# Patient Record
Sex: MALE | Race: WHITE | NOT HISPANIC OR LATINO | Employment: OTHER | URBAN - METROPOLITAN AREA
[De-identification: names, ages, dates, MRNs, and addresses within clinical notes are randomized per-mention and may not be internally consistent; named-entity substitution may affect disease eponyms.]

---

## 2023-04-14 ENCOUNTER — TELEPHONE (OUTPATIENT)
Dept: PRIMARY CARE | Facility: CLINIC | Age: 73
End: 2023-04-14
Payer: MEDICARE

## 2023-04-14 DIAGNOSIS — N52.9 MALE ERECTILE DISORDER OF ORGANIC ORIGIN: ICD-10-CM

## 2023-04-14 DIAGNOSIS — E29.1 HYPOGONADISM IN MALE: ICD-10-CM

## 2023-04-17 PROBLEM — G47.33 OSA ON CPAP: Status: ACTIVE | Noted: 2023-04-17

## 2023-04-17 PROBLEM — N52.9 MALE ERECTILE DISORDER OF ORGANIC ORIGIN: Status: ACTIVE | Noted: 2023-04-17

## 2023-04-17 PROBLEM — R39.11 BENIGN PROSTATIC HYPERPLASIA WITH URINARY HESITANCY: Status: ACTIVE | Noted: 2023-04-17

## 2023-04-17 PROBLEM — E29.1 HYPOGONADISM IN MALE: Status: ACTIVE | Noted: 2023-04-17

## 2023-04-17 PROBLEM — E78.5 HYPERLIPIDEMIA: Status: ACTIVE | Noted: 2023-04-17

## 2023-04-17 PROBLEM — K21.9 GERD (GASTROESOPHAGEAL REFLUX DISEASE): Status: ACTIVE | Noted: 2023-04-17

## 2023-04-17 PROBLEM — I10 BENIGN ESSENTIAL HYPERTENSION: Status: ACTIVE | Noted: 2023-04-17

## 2023-04-17 PROBLEM — I48.0 PAROXYSMAL ATRIAL FIBRILLATION (MULTI): Status: ACTIVE | Noted: 2023-04-17

## 2023-04-17 PROBLEM — N40.1 BENIGN PROSTATIC HYPERPLASIA WITH URINARY HESITANCY: Status: ACTIVE | Noted: 2023-04-17

## 2023-04-17 RX ORDER — PRAVASTATIN SODIUM 40 MG/1
40 TABLET ORAL DAILY
COMMUNITY
Start: 2023-02-26 | End: 2024-02-23 | Stop reason: SDUPTHER

## 2023-04-17 RX ORDER — SILDENAFIL 100 MG/1
100 TABLET, FILM COATED ORAL AS NEEDED
Qty: 10 TABLET | Refills: 3 | Status: SHIPPED | OUTPATIENT
Start: 2023-04-17

## 2023-04-17 RX ORDER — SILDENAFIL 100 MG/1
100 TABLET, FILM COATED ORAL AS NEEDED
COMMUNITY
End: 2023-04-17 | Stop reason: SDUPTHER

## 2023-04-17 RX ORDER — CYCLOBENZAPRINE HCL 10 MG
TABLET ORAL
COMMUNITY
Start: 2022-12-05 | End: 2023-06-22 | Stop reason: ALTCHOICE

## 2023-04-17 RX ORDER — LOSARTAN POTASSIUM AND HYDROCHLOROTHIAZIDE 25; 100 MG/1; MG/1
1 TABLET ORAL DAILY
COMMUNITY
Start: 2016-10-06 | End: 2023-06-22 | Stop reason: SDUPTHER

## 2023-04-17 RX ORDER — APIXABAN 5 MG/1
1 TABLET, FILM COATED ORAL 2 TIMES DAILY
COMMUNITY
End: 2023-05-30 | Stop reason: SDUPTHER

## 2023-04-17 RX ORDER — OMEPRAZOLE 20 MG/1
1 CAPSULE, DELAYED RELEASE ORAL DAILY
COMMUNITY
Start: 2016-09-07 | End: 2023-06-22 | Stop reason: SDUPTHER

## 2023-04-17 RX ORDER — METOPROLOL SUCCINATE 50 MG/1
1 TABLET, EXTENDED RELEASE ORAL DAILY
COMMUNITY
Start: 2017-08-09 | End: 2023-06-22 | Stop reason: SDUPTHER

## 2023-04-17 RX ORDER — AMLODIPINE BESYLATE 5 MG/1
1 TABLET ORAL DAILY
COMMUNITY
Start: 2020-06-08 | End: 2023-06-22 | Stop reason: SDUPTHER

## 2023-05-30 DIAGNOSIS — I48.0 PAROXYSMAL ATRIAL FIBRILLATION (MULTI): Primary | ICD-10-CM

## 2023-06-12 LAB
ALANINE AMINOTRANSFERASE (SGPT) (U/L) IN SER/PLAS: 20 U/L (ref 10–52)
ALBUMIN (G/DL) IN SER/PLAS: 4.2 G/DL (ref 3.4–5)
ALKALINE PHOSPHATASE (U/L) IN SER/PLAS: 49 U/L (ref 33–136)
ANION GAP IN SER/PLAS: 10 MMOL/L (ref 10–20)
ASPARTATE AMINOTRANSFERASE (SGOT) (U/L) IN SER/PLAS: 15 U/L (ref 9–39)
BILIRUBIN TOTAL (MG/DL) IN SER/PLAS: 0.9 MG/DL (ref 0–1.2)
CALCIUM (MG/DL) IN SER/PLAS: 9.6 MG/DL (ref 8.6–10.3)
CARBON DIOXIDE, TOTAL (MMOL/L) IN SER/PLAS: 30 MMOL/L (ref 21–32)
CHLORIDE (MMOL/L) IN SER/PLAS: 104 MMOL/L (ref 98–107)
CHOLESTEROL (MG/DL) IN SER/PLAS: 146 MG/DL (ref 0–199)
CHOLESTEROL IN HDL (MG/DL) IN SER/PLAS: 34.6 MG/DL
CHOLESTEROL/HDL RATIO: 4.2
CREATININE (MG/DL) IN SER/PLAS: 1.04 MG/DL (ref 0.5–1.3)
GFR MALE: 76 ML/MIN/1.73M2
GLUCOSE (MG/DL) IN SER/PLAS: 88 MG/DL (ref 74–99)
LDL: 89 MG/DL (ref 0–99)
POTASSIUM (MMOL/L) IN SER/PLAS: 4.2 MMOL/L (ref 3.5–5.3)
PROTEIN TOTAL: 7.1 G/DL (ref 6.4–8.2)
SODIUM (MMOL/L) IN SER/PLAS: 140 MMOL/L (ref 136–145)
TRIGLYCERIDE (MG/DL) IN SER/PLAS: 113 MG/DL (ref 0–149)
UREA NITROGEN (MG/DL) IN SER/PLAS: 19 MG/DL (ref 6–23)
VLDL: 23 MG/DL (ref 0–40)

## 2023-06-22 ENCOUNTER — OFFICE VISIT (OUTPATIENT)
Dept: PRIMARY CARE | Facility: CLINIC | Age: 73
End: 2023-06-22
Payer: MEDICARE

## 2023-06-22 VITALS
WEIGHT: 198 LBS | HEART RATE: 72 BPM | HEIGHT: 67 IN | SYSTOLIC BLOOD PRESSURE: 138 MMHG | BODY MASS INDEX: 31.08 KG/M2 | DIASTOLIC BLOOD PRESSURE: 82 MMHG

## 2023-06-22 DIAGNOSIS — N52.9 MALE ERECTILE DISORDER OF ORGANIC ORIGIN: ICD-10-CM

## 2023-06-22 DIAGNOSIS — G47.33 OSA ON CPAP: ICD-10-CM

## 2023-06-22 DIAGNOSIS — I10 BENIGN ESSENTIAL HYPERTENSION: ICD-10-CM

## 2023-06-22 DIAGNOSIS — N40.1 BENIGN PROSTATIC HYPERPLASIA WITH URINARY HESITANCY: ICD-10-CM

## 2023-06-22 DIAGNOSIS — E78.2 MIXED HYPERLIPIDEMIA: ICD-10-CM

## 2023-06-22 DIAGNOSIS — K21.9 GASTROESOPHAGEAL REFLUX DISEASE WITHOUT ESOPHAGITIS: ICD-10-CM

## 2023-06-22 DIAGNOSIS — E66.09 CLASS 1 OBESITY DUE TO EXCESS CALORIES WITH SERIOUS COMORBIDITY AND BODY MASS INDEX (BMI) OF 31.0 TO 31.9 IN ADULT: ICD-10-CM

## 2023-06-22 DIAGNOSIS — Z12.11 COLON CANCER SCREENING: ICD-10-CM

## 2023-06-22 DIAGNOSIS — R39.11 BENIGN PROSTATIC HYPERPLASIA WITH URINARY HESITANCY: ICD-10-CM

## 2023-06-22 DIAGNOSIS — I48.0 PAROXYSMAL ATRIAL FIBRILLATION (MULTI): ICD-10-CM

## 2023-06-22 DIAGNOSIS — Z00.00 MEDICARE ANNUAL WELLNESS VISIT, SUBSEQUENT: Primary | ICD-10-CM

## 2023-06-22 PROBLEM — E29.1 HYPOGONADISM IN MALE: Status: RESOLVED | Noted: 2023-04-17 | Resolved: 2023-06-22

## 2023-06-22 PROBLEM — E66.811 CLASS 1 OBESITY DUE TO EXCESS CALORIES WITH SERIOUS COMORBIDITY AND BODY MASS INDEX (BMI) OF 31.0 TO 31.9 IN ADULT: Status: ACTIVE | Noted: 2023-06-22

## 2023-06-22 PROCEDURE — 1159F MED LIST DOCD IN RCRD: CPT | Performed by: INTERNAL MEDICINE

## 2023-06-22 PROCEDURE — G0439 PPPS, SUBSEQ VISIT: HCPCS | Performed by: INTERNAL MEDICINE

## 2023-06-22 PROCEDURE — G0447 BEHAVIOR COUNSEL OBESITY 15M: HCPCS | Performed by: INTERNAL MEDICINE

## 2023-06-22 PROCEDURE — 1160F RVW MEDS BY RX/DR IN RCRD: CPT | Performed by: INTERNAL MEDICINE

## 2023-06-22 PROCEDURE — 1036F TOBACCO NON-USER: CPT | Performed by: INTERNAL MEDICINE

## 2023-06-22 PROCEDURE — 99397 PER PM REEVAL EST PAT 65+ YR: CPT | Performed by: INTERNAL MEDICINE

## 2023-06-22 PROCEDURE — 99214 OFFICE O/P EST MOD 30 MIN: CPT | Performed by: INTERNAL MEDICINE

## 2023-06-22 PROCEDURE — 3075F SYST BP GE 130 - 139MM HG: CPT | Performed by: INTERNAL MEDICINE

## 2023-06-22 PROCEDURE — 1170F FXNL STATUS ASSESSED: CPT | Performed by: INTERNAL MEDICINE

## 2023-06-22 PROCEDURE — G0446 INTENS BEHAVE THER CARDIO DX: HCPCS | Performed by: INTERNAL MEDICINE

## 2023-06-22 PROCEDURE — 99497 ADVNCD CARE PLAN 30 MIN: CPT | Performed by: INTERNAL MEDICINE

## 2023-06-22 PROCEDURE — 3079F DIAST BP 80-89 MM HG: CPT | Performed by: INTERNAL MEDICINE

## 2023-06-22 PROCEDURE — G0442 ANNUAL ALCOHOL SCREEN 15 MIN: HCPCS | Performed by: INTERNAL MEDICINE

## 2023-06-22 PROCEDURE — G0444 DEPRESSION SCREEN ANNUAL: HCPCS | Performed by: INTERNAL MEDICINE

## 2023-06-22 PROCEDURE — 3008F BODY MASS INDEX DOCD: CPT | Performed by: INTERNAL MEDICINE

## 2023-06-22 RX ORDER — METOPROLOL SUCCINATE 50 MG/1
50 TABLET, EXTENDED RELEASE ORAL DAILY
Qty: 90 TABLET | Refills: 3 | Status: SHIPPED | OUTPATIENT
Start: 2023-06-22

## 2023-06-22 RX ORDER — AMLODIPINE BESYLATE 5 MG/1
5 TABLET ORAL DAILY
Qty: 90 TABLET | Refills: 3 | Status: SHIPPED | OUTPATIENT
Start: 2023-06-22

## 2023-06-22 RX ORDER — LOSARTAN POTASSIUM AND HYDROCHLOROTHIAZIDE 25; 100 MG/1; MG/1
1 TABLET ORAL DAILY
Qty: 90 TABLET | Refills: 3 | Status: SHIPPED | OUTPATIENT
Start: 2023-06-22

## 2023-06-22 RX ORDER — OMEPRAZOLE 20 MG/1
20 CAPSULE, DELAYED RELEASE ORAL DAILY
Qty: 90 CAPSULE | Refills: 3 | Status: SHIPPED | OUTPATIENT
Start: 2023-06-22

## 2023-06-22 ASSESSMENT — ENCOUNTER SYMPTOMS
OCCASIONAL FEELINGS OF UNSTEADINESS: 0
DEPRESSION: 0
LOSS OF SENSATION IN FEET: 0

## 2023-06-22 ASSESSMENT — ACTIVITIES OF DAILY LIVING (ADL)
DRESSING: INDEPENDENT
BATHING: INDEPENDENT
DOING_HOUSEWORK: INDEPENDENT
TAKING_MEDICATION: INDEPENDENT
MANAGING_FINANCES: INDEPENDENT
GROCERY_SHOPPING: INDEPENDENT

## 2023-06-22 ASSESSMENT — PATIENT HEALTH QUESTIONNAIRE - PHQ9
2. FEELING DOWN, DEPRESSED OR HOPELESS: NOT AT ALL
1. LITTLE INTEREST OR PLEASURE IN DOING THINGS: NOT AT ALL
SUM OF ALL RESPONSES TO PHQ9 QUESTIONS 1 AND 2: 0

## 2023-06-22 ASSESSMENT — ANXIETY QUESTIONNAIRES
2. NOT BEING ABLE TO STOP OR CONTROL WORRYING: NOT AT ALL
6. BECOMING EASILY ANNOYED OR IRRITABLE: NOT AT ALL
IF YOU CHECKED OFF ANY PROBLEMS ON THIS QUESTIONNAIRE, HOW DIFFICULT HAVE THESE PROBLEMS MADE IT FOR YOU TO DO YOUR WORK, TAKE CARE OF THINGS AT HOME, OR GET ALONG WITH OTHER PEOPLE: NOT DIFFICULT AT ALL
3. WORRYING TOO MUCH ABOUT DIFFERENT THINGS: NOT AT ALL
5. BEING SO RESTLESS THAT IT IS HARD TO SIT STILL: NOT AT ALL
4. TROUBLE RELAXING: NOT AT ALL
7. FEELING AFRAID AS IF SOMETHING AWFUL MIGHT HAPPEN: NOT AT ALL
GAD7 TOTAL SCORE: 0
1. FEELING NERVOUS, ANXIOUS, OR ON EDGE: NOT AT ALL

## 2023-06-22 NOTE — PROGRESS NOTES
"Subjective   Reason for Visit: Gerhard Templeton is an 73 y.o. male here for a Medicare Wellness visit.     Past Medical, Surgical, and Family History reviewed and updated in chart.    Reviewed all medications by prescribing practitioner or clinical pharmacist (such as prescriptions, OTCs, herbal therapies and supplements) and documented in the medical record.    HPI    Patient Care Team:  Kavon Shepherd DO as PCP - General  Kavon Shepherd DO as PCP - [employer]age PCP     Review of Systems    Objective   Vitals:  /82   Pulse 72   Ht 1.702 m (5' 7\")   Wt 89.8 kg (198 lb)   BMI 31.01 kg/m²       Physical Exam    Assessment/Plan   Problem List Items Addressed This Visit    None         "

## 2023-06-22 NOTE — ASSESSMENT & PLAN NOTE
Encourage Shingrix vaccination up-to-date date with vaccinations and screenings repeat Cologuard testing for colon cancer screenings screen for alcohol depression completed today advanced medical directives completed follow-up in 6 months

## 2023-06-22 NOTE — ASSESSMENT & PLAN NOTE
Blood pressures ambulatory at home 120/70 encouraged weight loss reduction in sodium intake-diet  Continue amlodipine 5 mg daily with losartan hydrochlorothiazide 100/25 1 tablet daily

## 2023-06-22 NOTE — ASSESSMENT & PLAN NOTE
Compliant with regular nighttime use of CPAP on a 5-hour basis no signs of daytime hypersomnolence well-controlled at this time

## 2023-06-22 NOTE — ASSESSMENT & PLAN NOTE
Rate and rhythm controlled on metoprolol succinate ER 50 mg 1 tablet daily with apixaban 5 mg twice a day no evidence of bleeding

## 2023-06-22 NOTE — ASSESSMENT & PLAN NOTE
12 pound weight gain since last examination 6 months ago patient to reinitiate improvement in diet and exercise regimen to reduce weight less than 30 BMI goal weight 195 pounds

## 2023-06-22 NOTE — PROGRESS NOTES
Alcohol consumption becomes hazardous when consuming women [place] over 65 years old greater than 7 drinks per week or greater than 3 drinks per occasion for men greater than 14 drinks per week or greater than 4 drinks per occasion.  I spent 15 minutes screening for alcohol use.Depression and anxiety screening completed   PHQ9 score   GAD7 score   I spent 15 minutes obtaining and discussing depression screening using PHQ 2 questions with results documented in chart.  Screening using PHQ-9 and KAYLAN-7 scores were used for follow-up with treatment and referral plan discussed.      I spent greater than 15 minutes face-to-face with individual providing recommendations for nutrition choices and exercise plan to help achieve weight reductionI spent 15 minutes face-to-face with this individual discussing the cardiovascular risk and behavioral therapies of nutritional choices exercise and elimination of habits contributing to risk .We agreed on a plan how they may be able to reduce  current cardiovascular risk.  Per patient with calculation greater than 10% aspirin use was discussed and encouraged unless known allergy or increased risk of bleeding contraindicates use patient's 10-year CV risk estimate calculates:I spent greater than 15 minutes discussing advance care planning including the explanation and discussion of advanced directives.  If patient does not have current up-to-date documents examples and information provided on how to create both living will and power of . [facility] toolkit was given to patient and was encouraged to work out completing these documents.Subjective   Reason for Visit: Gerhard Templeton is an 73 y.o. male here for a Medicare Wellness visit.     Past Medical, Surgical, and Family History reviewed and updated in chart.    Reviewed all medications by prescribing practitioner or clinical pharmacist (such as prescriptions, OTCs, herbal therapies and supplements) and documented in the medical  "record.    HPI    Patient Care Team:  Kavon Shepherd DO as PCP - General  Kavon Shepherd DO as PCP - [employer] Advantage PCP     Review of Systems   All other systems reviewed and are negative.      Objective   Vitals:  /82   Pulse 72   Ht 1.702 m (5' 7\")   Wt 89.8 kg (198 lb)   BMI 31.01 kg/m²       Physical Exam  Vitals and nursing note reviewed.   Constitutional:       General: He is not in acute distress.     Appearance: Normal appearance. He is well-developed. He is not toxic-appearing.   HENT:      Head: Normocephalic and atraumatic.      Right Ear: Tympanic membrane and external ear normal.      Left Ear: Tympanic membrane and external ear normal.      Nose: Nose normal.      Mouth/Throat:      Mouth: Mucous membranes are moist.      Pharynx: Oropharynx is clear. No oropharyngeal exudate or posterior oropharyngeal erythema.      Tonsils: No tonsillar exudate. 2+ on the right. 2+ on the left.   Eyes:      Extraocular Movements: Extraocular movements intact.      Conjunctiva/sclera: Conjunctivae normal.   Cardiovascular:      Rate and Rhythm: Normal rate and regular rhythm.      Pulses: Normal pulses.      Heart sounds: Normal heart sounds. No murmur heard.  Pulmonary:      Effort: Pulmonary effort is normal.      Breath sounds: Normal breath sounds.   Abdominal:      General: Abdomen is flat. Bowel sounds are normal.      Palpations: Abdomen is soft.   Musculoskeletal:      Cervical back: Neck supple.   Feet:      Right foot:      Skin integrity: Skin integrity normal. No ulcer, blister, skin breakdown, erythema, warmth or callus.      Toenail Condition: Right toenails are normal.      Left foot:      Skin integrity: Skin integrity normal. No ulcer, blister, skin breakdown, erythema, warmth or callus.      Toenail Condition: Left toenails are normal.   Lymphadenopathy:      Cervical: No cervical adenopathy.   Skin:     General: Skin is warm and dry.      Capillary Refill: Capillary " refill takes more than 3 seconds.      Findings: No rash.   Neurological:      Mental Status: He is alert. Mental status is at baseline.      Sensory: Sensation is intact.   Psychiatric:         Mood and Affect: Mood normal.         Behavior: Behavior normal.         Thought Content: Thought content normal.         Judgment: Judgment normal.         Assessment/Plan   Problem List Items Addressed This Visit       Benign essential hypertension    Current Assessment & Plan     Blood pressures ambulatory at home 120/70 encouraged weight loss reduction in sodium intake-diet  Continue amlodipine 5 mg daily with losartan hydrochlorothiazide 100/25 1 tablet daily         Relevant Medications    amLODIPine (Norvasc) 5 mg tablet    losartan-hydrochlorothiazide (Hyzaar) 100-25 mg tablet    metoprolol succinate XL (Toprol-XL) 50 mg 24 hr tablet    omeprazole (PriLOSEC) 20 mg DR capsule    Other Relevant Orders    Hepatitis C antibody    Comprehensive metabolic panel    Lipid Panel    Cologuard® colon cancer screening    Follow Up In [facility] - PCP    Benign prostatic hyperplasia with urinary hesitancy    Current Assessment & Plan     Symptoms stable and unchanged at this time         Relevant Orders    Hepatitis C antibody    Comprehensive metabolic panel    Lipid Panel    Cologuard® colon cancer screening    Follow Up In [facility] - PCP    GERD (gastroesophageal reflux disease)    Current Assessment & Plan     Symptoms controlled on omeprazole 20 mg daily         Relevant Orders    Hepatitis C antibody    Comprehensive metabolic panel    Lipid Panel    Cologuard® colon cancer screening    Follow Up In [facility] - PCP    Hyperlipidemia    Relevant Orders    Hepatitis C antibody    Comprehensive metabolic panel    Lipid Panel    Cologuard® colon cancer screening    Follow Up In [facility] - PCP    Male erectile disorder of organic origin    Current Assessment & Plan     Continue  with as needed use of sildenafil         Relevant Orders    Hepatitis C antibody    Comprehensive metabolic panel    Lipid Panel    Cologuard® colon cancer screening    Follow Up In [facility] - PCP    ROSA on CPAP    Current Assessment & Plan     Compliant with regular nighttime use of CPAP on a 5-hour basis no signs of daytime hypersomnolence well-controlled at this time         Relevant Orders    Hepatitis C antibody    Comprehensive metabolic panel    Lipid Panel    Cologuard® colon cancer screening    Follow Up In [facility] - PCP    Paroxysmal atrial fibrillation (CMS/HCC)    Current Assessment & Plan     Rate and rhythm controlled on metoprolol succinate ER 50 mg 1 tablet daily with apixaban 5 mg twice a day no evidence of bleeding         Relevant Medications    amLODIPine (Norvasc) 5 mg tablet    metoprolol succinate XL (Toprol-XL) 50 mg 24 hr tablet    Other Relevant Orders    Hepatitis C antibody    Comprehensive metabolic panel    Lipid Panel    Cologuard® colon cancer screening    Follow Up In [facility] - PCP    Medicare annual wellness visit, subsequent - Primary    Current Assessment & Plan     Encourage Shingrix vaccination up-to-date date with vaccinations and screenings repeat Cologuard testing for colon cancer screenings screen for alcohol depression completed today advanced medical directives completed follow-up in 6 months         Relevant Orders    Hepatitis C antibody    Comprehensive metabolic panel    Lipid Panel    Cologuard® colon cancer screening    Follow Up In [facility] - PCP    Class 1 obesity due to excess calories with serious comorbidity and body mass index (BMI) of 31.0 to 31.9 in adult    Current Assessment & Plan     12 pound weight gain since last examination 6 months ago patient to reinitiate improvement in diet and exercise regimen to reduce weight less than 30 BMI goal weight 195 pounds         Relevant Orders    Hepatitis C  antibody    Comprehensive metabolic panel    Lipid Panel    Cologuard® colon cancer screening    Follow Up In [facility] - PCP    Colon cancer screening    Relevant Orders    Cologuard® colon cancer screening

## 2023-07-14 LAB — NONINV COLON CA DNA+OCC BLD SCRN STL QL: NEGATIVE

## 2023-10-04 ENCOUNTER — TELEPHONE (OUTPATIENT)
Dept: PRIMARY CARE | Facility: CLINIC | Age: 73
End: 2023-10-04
Payer: MEDICARE

## 2023-10-04 DIAGNOSIS — U07.1 COVID-19: Primary | ICD-10-CM

## 2023-10-04 RX ORDER — NIRMATRELVIR AND RITONAVIR 300-100 MG
3 KIT ORAL 2 TIMES DAILY
Qty: 30 TABLET | Refills: 0 | Status: SHIPPED | OUTPATIENT
Start: 2023-10-04 | End: 2023-10-09

## 2023-10-04 NOTE — TELEPHONE ENCOUNTER
Wife informed rx called in hold statin for 2 weeks,reduce apixaban to 2.5mg 2 times a day when on paxlovid resume 5mg 2 times after finishing paxlovid.    Paxlovid called in.  Pravastatin hold for 2 weeks reduce apixaban to 2.5 mg twice a day while on Paxlovid okay to resume 5 mg twice a day after finishing medication

## 2023-10-04 NOTE — TELEPHONE ENCOUNTER
Chief Complaint: Covid positive today     Symptoms: headache, sinus pressure, sinus congestion, cough    Duration: two week ago and tested but it was negative      Treatments:    Patient Requesting: Recommendation     Did the patient have their Covid Vaccine? Yes but not the one from fall     Pharmacy: [facility]

## 2023-10-26 ENCOUNTER — OFFICE VISIT (OUTPATIENT)
Dept: CARDIOLOGY | Facility: CLINIC | Age: 73
End: 2023-10-26
Payer: MEDICARE

## 2023-10-26 VITALS
BODY MASS INDEX: 30.32 KG/M2 | HEART RATE: 81 BPM | HEIGHT: 67 IN | SYSTOLIC BLOOD PRESSURE: 118 MMHG | DIASTOLIC BLOOD PRESSURE: 78 MMHG | WEIGHT: 193.2 LBS

## 2023-10-26 DIAGNOSIS — I10 BENIGN ESSENTIAL HYPERTENSION: ICD-10-CM

## 2023-10-26 DIAGNOSIS — E78.5 HYPERLIPIDEMIA, UNSPECIFIED HYPERLIPIDEMIA TYPE: ICD-10-CM

## 2023-10-26 DIAGNOSIS — I48.0 PAROXYSMAL ATRIAL FIBRILLATION (MULTI): Primary | ICD-10-CM

## 2023-10-26 PROCEDURE — 1159F MED LIST DOCD IN RCRD: CPT | Performed by: INTERNAL MEDICINE

## 2023-10-26 PROCEDURE — 1160F RVW MEDS BY RX/DR IN RCRD: CPT | Performed by: INTERNAL MEDICINE

## 2023-10-26 PROCEDURE — 99214 OFFICE O/P EST MOD 30 MIN: CPT | Performed by: INTERNAL MEDICINE

## 2023-10-26 PROCEDURE — 3008F BODY MASS INDEX DOCD: CPT | Performed by: INTERNAL MEDICINE

## 2023-10-26 PROCEDURE — 1036F TOBACCO NON-USER: CPT | Performed by: INTERNAL MEDICINE

## 2023-10-26 PROCEDURE — 3078F DIAST BP <80 MM HG: CPT | Performed by: INTERNAL MEDICINE

## 2023-10-26 PROCEDURE — 3074F SYST BP LT 130 MM HG: CPT | Performed by: INTERNAL MEDICINE

## 2023-10-26 PROCEDURE — 93000 ELECTROCARDIOGRAM COMPLETE: CPT | Performed by: INTERNAL MEDICINE

## 2023-10-26 RX ORDER — DOXYCYCLINE HYCLATE 100 MG
100 TABLET ORAL 2 TIMES DAILY
COMMUNITY
Start: 2023-10-14 | End: 2023-10-26 | Stop reason: WASHOUT

## 2023-10-26 NOTE — ASSESSMENT & PLAN NOTE
Patient has hypertension which is well-controlled on the current regimen. Blood pressure goal is less than 140/90 and preferably towards 120/70.  Blood pressure is at goal.. Patient will continue the regimen. Advised to keep home blood pressure log and bring to PCP

## 2023-10-26 NOTE — ASSESSMENT & PLAN NOTE
The patient has paroxysmal atrial fibrillation. He is maintaining sinus rhythm the majority of the time. He does not feel the atrial fibrillation at all. He is [id number] 2 , remains on OAC.  Plan rate control only at this point.

## 2023-10-26 NOTE — ASSESSMENT & PLAN NOTE
The patient has hyperlipidemia which is well-controlled on the current regimen. He will continue his medication.

## 2023-10-26 NOTE — PROGRESS NOTES
"Chief Complaint:   No chief complaint on file.     History Of Present Illness:    Gerhard Templeton is a 73 y.o. male  (pronounced \" Like- liter\")  who presents for cardiac follow-up of paroxysmal atrial fibrillation and hypertension. The patient was diagnosed with paroxysmal atrial fibrillation when he presented for his second knee operation. He spontaneously converted to sinus rhythm and did not need cardioversion. With a [id number] of 2 he was placed on anticoagulation therapy. He is also on beta blockers for ventricular rate control and antihypertensive effect. The patient tells me that he is feeling well without chest pain, dyspnea, orthopnea, PND, palpitation, lightheadedness, syncope, or edema. No claudication. He has been taking his medications as prescribed and without adverse effect.     EKG is a fibrillation with controlled ventricular response with low voltage in the precordial leads. Incomplete right bundle branch block. Left atrial enlargement. LVH. Unchanged.      Echocardiogram in March 2017 with normal left and right ventricular systolic function. Mild mitral regurgitation. Mild tricuspid regurgitation.     Regadenoson myocardial perfusion scan in March 2017 showed normal perfusion.     In February 2020, cholesterol 130, triglycerides 78, HDL 35, LDL 80, creatinine 0.99, potassium 3.8, TSH 3.2. In Jan 2022- . .     Last Recorded Vitals:  Vitals:    10/26/23 1503   BP: 118/78   Pulse: 81   Weight: 87.6 kg (193 lb 3.2 oz)   Height: 1.702 m (5' 7\")       Past Medical History:  He has a past medical history of Encounter for preprocedural cardiovascular examination (03/15/2017), Obstructive sleep apnea (adult) (pediatric), Personal history of other diseases of the circulatory system (03/07/2019), Personal history of other diseases of the digestive system, and Personal history of other diseases of the musculoskeletal system and connective tissue.    Past Surgical History:  He has a past " surgical history that includes Total knee arthroplasty (03/10/2017); Other surgical history (03/10/2017); Other surgical history (03/10/2017); and Rotator cuff repair (03/15/2017).      Social History:  He reports that he has never smoked. He has never used smokeless tobacco. He reports that he does not currently use alcohol. He reports that he does not use drugs.    Family History:  Family History   Problem Relation Name Age of Onset    Atrial fibrillation Mother      Other (CVA) Mother      Coronary artery disease Father      Heart attack Father          Allergies:  Patient has no known allergies.    Outpatient Medications:  Current Outpatient Medications   Medication Instructions    amLODIPine (NORVASC) 5 mg, oral, Daily    apixaban (ELIQUIS) 5 mg, oral, 2 times daily    doxycycline (VIBRA-TABS) 100 mg, oral, 2 times daily    losartan-hydrochlorothiazide (Hyzaar) 100-25 mg tablet 1 tablet, oral, Daily    metoprolol succinate XL (TOPROL-XL) 50 mg, oral, Daily    omeprazole (PRILOSEC) 20 mg, oral, Daily    pravastatin (PRAVACHOL) 40 mg, oral, Daily    sildenafil (VIAGRA) 100 mg, oral, As needed       Physical Exam:  Physical Exam  Vitals reviewed.   Constitutional:       Appearance: Normal appearance.   Neck:      Vascular: No carotid bruit or JVD.   Cardiovascular:      Rate and Rhythm: Normal rate. Rhythm regularly irregular.      Heart sounds: Normal heart sounds, S1 normal and S2 normal. No murmur heard.  Pulmonary:      Effort: Pulmonary effort is normal.      Breath sounds: Normal breath sounds.   Abdominal:      General: Abdomen is flat. Bowel sounds are normal.      Palpations: Abdomen is soft.   Musculoskeletal:      Right lower leg: No edema.      Left lower leg: No edema.   Skin:     General: Skin is warm.   Neurological:      Mental Status: He is alert. Mental status is at baseline.   Psychiatric:         Mood and Affect: Mood normal.         Behavior: Behavior normal.           Last Labs:  CBC -  Lab  "Results   Component Value Date    WBC 5.9 04/29/2022    HGB 14.6 04/29/2022    HCT 43.1 04/29/2022    MCV 86 04/29/2022     04/29/2022       CMP -  Lab Results   Component Value Date    CALCIUM 9.6 06/12/2023    PROT 7.1 06/12/2023    ALBUMIN 4.2 06/12/2023    AST 15 06/12/2023    ALT 20 06/12/2023    ALKPHOS 49 06/12/2023    BILITOT 0.9 06/12/2023       LIPID PANEL -   Lab Results   Component Value Date    CHOL 146 06/12/2023    TRIG 113 06/12/2023    HDL 34.6 (A) 06/12/2023    CHHDL 4.2 06/12/2023    LDLF 89 06/12/2023    VLDL 23 06/12/2023       RENAL FUNCTION PANEL -   Lab Results   Component Value Date    GLUCOSE 88 06/12/2023     06/12/2023    K 4.2 06/12/2023     06/12/2023    CO2 30 06/12/2023    ANIONGAP 10 06/12/2023    BUN 19 06/12/2023    CREATININE 1.04 06/12/2023    GFRMALE 76 06/12/2023    CALCIUM 9.6 06/12/2023    ALBUMIN 4.2 06/12/2023        No results found for: \"BNP\", \"HGBA1C\"      Assessment/Plan   Problem List Items Addressed This Visit             ICD-10-CM    Benign essential hypertension I10     Patient has hypertension which is well-controlled on the current regimen. Blood pressure goal is less than 140/90 and preferably towards 120/70.  Blood pressure is at goal.. Patient will continue the regimen. Advised to keep home blood pressure log and bring to PCP          Relevant Orders    ECG 12 lead (Clinic Performed) (Completed)    Follow Up In Cardiology    Hyperlipidemia E78.5     The patient has hyperlipidemia which is well-controlled on the current regimen. He will continue his medication.          Paroxysmal atrial fibrillation (CMS/HCC) - Primary I48.0     The patient has paroxysmal atrial fibrillation. He is maintaining sinus rhythm the majority of the time. He does not feel the atrial fibrillation at all. He is [id number] 2 , remains on OAC.  Plan rate control only at this point.            Relevant Orders    ECG 12 lead (Clinic Performed) (Completed)    Follow Up In " Cardiology         Katie Narayanan MD

## 2023-10-28 DIAGNOSIS — I48.0 PAROXYSMAL ATRIAL FIBRILLATION (MULTI): ICD-10-CM

## 2023-10-30 ENCOUNTER — TELEPHONE (OUTPATIENT)
Dept: PRIMARY CARE | Facility: CLINIC | Age: 73
End: 2023-10-30
Payer: MEDICARE

## 2023-10-30 DIAGNOSIS — I48.0 PAROXYSMAL ATRIAL FIBRILLATION (MULTI): ICD-10-CM

## 2023-10-30 RX ORDER — APIXABAN 5 MG/1
5 TABLET, FILM COATED ORAL 2 TIMES DAILY
Qty: 180 TABLET | Refills: 3 | Status: SHIPPED | OUTPATIENT
Start: 2023-10-30

## 2023-10-30 NOTE — TELEPHONE ENCOUNTER
Needs a refill on his Eliquis 5 mg takes it 2 time a day [facility] to [facility]. And can we send a short term to [facility] in [place]

## 2023-12-22 ENCOUNTER — OFFICE VISIT (OUTPATIENT)
Dept: PRIMARY CARE | Facility: CLINIC | Age: 73
End: 2023-12-22
Payer: MEDICARE

## 2023-12-22 VITALS
WEIGHT: 198.8 LBS | BODY MASS INDEX: 31.2 KG/M2 | SYSTOLIC BLOOD PRESSURE: 122 MMHG | OXYGEN SATURATION: 98 % | DIASTOLIC BLOOD PRESSURE: 80 MMHG | HEIGHT: 67 IN | HEART RATE: 65 BPM

## 2023-12-22 DIAGNOSIS — K21.9 GASTROESOPHAGEAL REFLUX DISEASE WITHOUT ESOPHAGITIS: ICD-10-CM

## 2023-12-22 DIAGNOSIS — I48.0 PAROXYSMAL ATRIAL FIBRILLATION (MULTI): ICD-10-CM

## 2023-12-22 DIAGNOSIS — N40.1 BENIGN PROSTATIC HYPERPLASIA WITH URINARY HESITANCY: ICD-10-CM

## 2023-12-22 DIAGNOSIS — E66.09 CLASS 1 OBESITY DUE TO EXCESS CALORIES WITH SERIOUS COMORBIDITY AND BODY MASS INDEX (BMI) OF 31.0 TO 31.9 IN ADULT: ICD-10-CM

## 2023-12-22 DIAGNOSIS — E78.2 MIXED HYPERLIPIDEMIA: ICD-10-CM

## 2023-12-22 DIAGNOSIS — Z12.11 COLON CANCER SCREENING: ICD-10-CM

## 2023-12-22 DIAGNOSIS — I10 BENIGN ESSENTIAL HYPERTENSION: Primary | ICD-10-CM

## 2023-12-22 DIAGNOSIS — N52.9 MALE ERECTILE DISORDER OF ORGANIC ORIGIN: ICD-10-CM

## 2023-12-22 DIAGNOSIS — G47.33 OSA ON CPAP: ICD-10-CM

## 2023-12-22 DIAGNOSIS — R39.11 BENIGN PROSTATIC HYPERPLASIA WITH URINARY HESITANCY: ICD-10-CM

## 2023-12-22 PROCEDURE — 3079F DIAST BP 80-89 MM HG: CPT | Performed by: INTERNAL MEDICINE

## 2023-12-22 PROCEDURE — 99213 OFFICE O/P EST LOW 20 MIN: CPT | Performed by: INTERNAL MEDICINE

## 2023-12-22 PROCEDURE — 1159F MED LIST DOCD IN RCRD: CPT | Performed by: INTERNAL MEDICINE

## 2023-12-22 PROCEDURE — 3074F SYST BP LT 130 MM HG: CPT | Performed by: INTERNAL MEDICINE

## 2023-12-22 PROCEDURE — 1160F RVW MEDS BY RX/DR IN RCRD: CPT | Performed by: INTERNAL MEDICINE

## 2023-12-22 PROCEDURE — 1036F TOBACCO NON-USER: CPT | Performed by: INTERNAL MEDICINE

## 2023-12-22 PROCEDURE — 3008F BODY MASS INDEX DOCD: CPT | Performed by: INTERNAL MEDICINE

## 2023-12-22 NOTE — ASSESSMENT & PLAN NOTE
Blood pressure is well-controlled on losartan hydrochlorothiazide 100/25 1 tablet daily amlodipine 5 mg daily and metoprolol succinate ER 50 mg 1 tablet daily reevaluate blood work next visit

## 2023-12-22 NOTE — ASSESSMENT & PLAN NOTE
Compliant with regular nighttime use of CPAP with effective control of daytime hypersomnolence no issues at this time continue

## 2023-12-22 NOTE — PROGRESS NOTES
"Subjective   Reason for Visit: Gerhard Templeton is an 73 y.o. male here for a fu visit.     Past Medical, Surgical, and Family History reviewed and updated in chart.    Reviewed all medications by prescribing practitioner or clinical pharmacist (such as prescriptions, OTCs, herbal therapies and supplements) and documented in the medical record.    HPI    Patient Care Team:  Kavon Shepherd DO as PCP - General  Kavon Shepherd DO as PCP - [employer]age PCP     Review of Systems   All other systems reviewed and are negative.      Objective   Vitals:  /80 (BP Location: Right arm, Patient Position: Sitting)   Pulse 65   Ht 1.702 m (5' 7\")   Wt 90.2 kg (198 lb 12.8 oz)   SpO2 98%   BMI 31.14 kg/m²       Physical Exam  Vitals and nursing note reviewed.   Constitutional:       General: He is not in acute distress.     Appearance: Normal appearance. He is well-developed. He is obese. He is not toxic-appearing.   HENT:      Head: Normocephalic and atraumatic.      Right Ear: Tympanic membrane and external ear normal.      Left Ear: Tympanic membrane and external ear normal.      Nose: Nose normal.      Mouth/Throat:      Mouth: Mucous membranes are moist.      Pharynx: Oropharynx is clear. No oropharyngeal exudate or posterior oropharyngeal erythema.      Tonsils: No tonsillar exudate. 2+ on the right. 2+ on the left.   Eyes:      Extraocular Movements: Extraocular movements intact.      Conjunctiva/sclera: Conjunctivae normal.   Cardiovascular:      Rate and Rhythm: Normal rate and regular rhythm.      Pulses: Normal pulses.      Heart sounds: Normal heart sounds. No murmur heard.  Pulmonary:      Effort: Pulmonary effort is normal.      Breath sounds: Normal breath sounds.   Abdominal:      General: Abdomen is flat. Bowel sounds are normal.      Palpations: Abdomen is soft.   Musculoskeletal:      Cervical back: Neck supple.   Feet:      Right foot:      Skin integrity: Skin integrity " normal. No ulcer, blister, skin breakdown, erythema, warmth or callus.      Toenail Condition: Right toenails are normal.      Left foot:      Skin integrity: Skin integrity normal. No ulcer, blister, skin breakdown, erythema, warmth or callus.      Toenail Condition: Left toenails are normal.   Lymphadenopathy:      Cervical: No cervical adenopathy.   Skin:     General: Skin is warm and dry.      Capillary Refill: Capillary refill takes more than 3 seconds.      Findings: No rash.   Neurological:      Mental Status: He is alert. Mental status is at baseline.      Sensory: Sensation is intact.   Psychiatric:         Mood and Affect: Mood normal.         Behavior: Behavior normal.         Thought Content: Thought content normal.         Judgment: Judgment normal.         Assessment/Plan   Problem List Items Addressed This Visit       Benign essential hypertension - Primary    Current Assessment & Plan     Blood pressure is well-controlled on losartan hydrochlorothiazide 100/25 1 tablet daily amlodipine 5 mg daily and metoprolol succinate ER 50 mg 1 tablet daily reevaluate blood work next visit         Relevant Orders    Follow Up In [facility] - PCP - Established    Comprehensive metabolic panel    CBC and Auto Differential    Lipid Panel    Benign prostatic hyperplasia with urinary hesitancy    GERD (gastroesophageal reflux disease)    Hyperlipidemia    Current Assessment & Plan     Reevaluate lipid profile stable and optimal on pravastatin 40 mg daily continue         Relevant Orders    Follow Up In [facility] - PCP - Established    Comprehensive metabolic panel    CBC and Auto Differential    Lipid Panel    Male erectile disorder of organic origin    ROSA on CPAP    Current Assessment & Plan     Compliant with regular nighttime use of CPAP with effective control of daytime hypersomnolence no issues at this time continue         Paroxysmal atrial fibrillation (CMS/HCC)    Current Assessment &  Plan     Normal sinus rhythm at this time today doing well continue metoprolol succinate ER 50 mg 1 tablet daily and apixaban 5 mg twice a day no bleeding issues at this time check blood work patient denies any issues with fatigue or exertional intolerance         Relevant Orders    Follow Up In [facility] - PCP - Established    Comprehensive metabolic panel    CBC and Auto Differential    Lipid Panel    Class 1 obesity due to excess calories with serious comorbidity and body mass index (BMI) of 31.0 to 31.9 in adult    Current Assessment & Plan     Work on improving BMI to less than 38 with diet and exercise changes reevaluate with next visit education given         Relevant Orders    Follow Up In [facility] - PCP - Established    Comprehensive metabolic panel    CBC and Auto Differential    Lipid Panel    Colon cancer screening    Current Assessment & Plan     Cologuard testing negative completed 7/10/2023 okay for 3 years surveillance

## 2023-12-22 NOTE — ASSESSMENT & PLAN NOTE
Work on improving BMI to less than 38 with diet and exercise changes reevaluate with next visit education given

## 2023-12-22 NOTE — ASSESSMENT & PLAN NOTE
Normal sinus rhythm at this time today doing well continue metoprolol succinate ER 50 mg 1 tablet daily and apixaban 5 mg twice a day no bleeding issues at this time check blood work patient denies any issues with fatigue or exertional intolerance

## 2023-12-22 NOTE — PROGRESS NOTES
"Subjective   Patient ID: Gerhard Templeton is a 73 y.o. male who presents for Follow-up (6 month).    HPI     Review of Systems    Objective   /80 (BP Location: Right arm, Patient Position: Sitting)   Pulse 65   Ht 1.702 m (5' 7\")   Wt 90.2 kg (198 lb 12.8 oz)   SpO2 98%   BMI 31.14 kg/m²     Physical Exam    Assessment/Plan          "

## 2024-02-23 ENCOUNTER — TELEPHONE (OUTPATIENT)
Dept: PRIMARY CARE | Facility: CLINIC | Age: 74
End: 2024-02-23
Payer: MEDICARE

## 2024-02-23 DIAGNOSIS — E78.2 MIXED HYPERLIPIDEMIA: ICD-10-CM

## 2024-02-23 RX ORDER — PRAVASTATIN SODIUM 40 MG/1
40 TABLET ORAL DAILY
Qty: 90 TABLET | Refills: 3 | Status: SHIPPED | OUTPATIENT
Start: 2024-02-23

## 2024-06-10 ENCOUNTER — LAB (OUTPATIENT)
Dept: LAB | Facility: LAB | Age: 74
End: 2024-06-10
Payer: MEDICARE

## 2024-06-10 DIAGNOSIS — Z00.00 MEDICARE ANNUAL WELLNESS VISIT, SUBSEQUENT: ICD-10-CM

## 2024-06-10 DIAGNOSIS — E66.09 CLASS 1 OBESITY DUE TO EXCESS CALORIES WITH SERIOUS COMORBIDITY AND BODY MASS INDEX (BMI) OF 31.0 TO 31.9 IN ADULT: ICD-10-CM

## 2024-06-10 DIAGNOSIS — E78.2 MIXED HYPERLIPIDEMIA: ICD-10-CM

## 2024-06-10 DIAGNOSIS — I48.0 PAROXYSMAL ATRIAL FIBRILLATION (MULTI): ICD-10-CM

## 2024-06-10 DIAGNOSIS — N52.9 MALE ERECTILE DISORDER OF ORGANIC ORIGIN: ICD-10-CM

## 2024-06-10 DIAGNOSIS — I10 BENIGN ESSENTIAL HYPERTENSION: ICD-10-CM

## 2024-06-10 DIAGNOSIS — R39.11 BENIGN PROSTATIC HYPERPLASIA WITH URINARY HESITANCY: ICD-10-CM

## 2024-06-10 DIAGNOSIS — K21.9 GASTROESOPHAGEAL REFLUX DISEASE WITHOUT ESOPHAGITIS: ICD-10-CM

## 2024-06-10 DIAGNOSIS — G47.33 OSA ON CPAP: ICD-10-CM

## 2024-06-10 DIAGNOSIS — N40.1 BENIGN PROSTATIC HYPERPLASIA WITH URINARY HESITANCY: ICD-10-CM

## 2024-06-10 LAB
ALBUMIN SERPL BCP-MCNC: 4.5 G/DL (ref 3.4–5)
ALP SERPL-CCNC: 51 U/L (ref 33–136)
ALT SERPL W P-5'-P-CCNC: 17 U/L (ref 10–52)
ANION GAP SERPL CALC-SCNC: 12 MMOL/L (ref 10–20)
AST SERPL W P-5'-P-CCNC: 15 U/L (ref 9–39)
BASOPHILS # BLD AUTO: 0.03 X10*3/UL (ref 0–0.1)
BASOPHILS NFR BLD AUTO: 0.6 %
BILIRUB SERPL-MCNC: 1.9 MG/DL (ref 0–1.2)
BUN SERPL-MCNC: 13 MG/DL (ref 6–23)
CALCIUM SERPL-MCNC: 9.3 MG/DL (ref 8.6–10.3)
CHLORIDE SERPL-SCNC: 100 MMOL/L (ref 98–107)
CHOLEST SERPL-MCNC: 141 MG/DL (ref 0–199)
CHOLESTEROL/HDL RATIO: 4
CO2 SERPL-SCNC: 28 MMOL/L (ref 21–32)
CREAT SERPL-MCNC: 0.98 MG/DL (ref 0.5–1.3)
EGFRCR SERPLBLD CKD-EPI 2021: 81 ML/MIN/1.73M*2
EOSINOPHIL # BLD AUTO: 0.11 X10*3/UL (ref 0–0.4)
EOSINOPHIL NFR BLD AUTO: 2 %
ERYTHROCYTE [DISTWIDTH] IN BLOOD BY AUTOMATED COUNT: 13.6 % (ref 11.5–14.5)
GLUCOSE SERPL-MCNC: 80 MG/DL (ref 74–99)
HCT VFR BLD AUTO: 45.4 % (ref 41–52)
HCV AB SER QL: NONREACTIVE
HDLC SERPL-MCNC: 35.2 MG/DL
HGB BLD-MCNC: 15.5 G/DL (ref 13.5–17.5)
IMM GRANULOCYTES # BLD AUTO: 0.02 X10*3/UL (ref 0–0.5)
IMM GRANULOCYTES NFR BLD AUTO: 0.4 % (ref 0–0.9)
LDLC SERPL CALC-MCNC: 87 MG/DL
LYMPHOCYTES # BLD AUTO: 1.94 X10*3/UL (ref 0.8–3)
LYMPHOCYTES NFR BLD AUTO: 36.1 %
MCH RBC QN AUTO: 29.5 PG (ref 26–34)
MCHC RBC AUTO-ENTMCNC: 34.1 G/DL (ref 32–36)
MCV RBC AUTO: 87 FL (ref 80–100)
MONOCYTES # BLD AUTO: 0.57 X10*3/UL (ref 0.05–0.8)
MONOCYTES NFR BLD AUTO: 10.6 %
NEUTROPHILS # BLD AUTO: 2.7 X10*3/UL (ref 1.6–5.5)
NEUTROPHILS NFR BLD AUTO: 50.3 %
NON HDL CHOLESTEROL: 106 MG/DL (ref 0–149)
NRBC BLD-RTO: 0 /100 WBCS (ref 0–0)
PLATELET # BLD AUTO: 298 X10*3/UL (ref 150–450)
POTASSIUM SERPL-SCNC: 4 MMOL/L (ref 3.5–5.3)
PROT SERPL-MCNC: 7 G/DL (ref 6.4–8.2)
RBC # BLD AUTO: 5.25 X10*6/UL (ref 4.5–5.9)
SODIUM SERPL-SCNC: 136 MMOL/L (ref 136–145)
TRIGL SERPL-MCNC: 94 MG/DL (ref 0–149)
VLDL: 19 MG/DL (ref 0–40)
WBC # BLD AUTO: 5.4 X10*3/UL (ref 4.4–11.3)

## 2024-06-10 PROCEDURE — 86803 HEPATITIS C AB TEST: CPT

## 2024-06-10 PROCEDURE — 85025 COMPLETE CBC W/AUTO DIFF WBC: CPT

## 2024-06-10 PROCEDURE — 36415 COLL VENOUS BLD VENIPUNCTURE: CPT

## 2024-06-10 PROCEDURE — 80061 LIPID PANEL: CPT

## 2024-06-10 PROCEDURE — 80053 COMPREHEN METABOLIC PANEL: CPT

## 2024-06-25 ENCOUNTER — APPOINTMENT (OUTPATIENT)
Dept: PRIMARY CARE | Facility: CLINIC | Age: 74
End: 2024-06-25
Payer: MEDICARE

## 2024-06-25 VITALS
BODY MASS INDEX: 28.88 KG/M2 | DIASTOLIC BLOOD PRESSURE: 72 MMHG | WEIGHT: 184 LBS | HEART RATE: 58 BPM | HEIGHT: 67 IN | SYSTOLIC BLOOD PRESSURE: 108 MMHG

## 2024-06-25 DIAGNOSIS — N52.9 MALE ERECTILE DISORDER OF ORGANIC ORIGIN: ICD-10-CM

## 2024-06-25 DIAGNOSIS — R17 ELEVATED BILIRUBIN: ICD-10-CM

## 2024-06-25 DIAGNOSIS — E66.09 CLASS 1 OBESITY DUE TO EXCESS CALORIES WITH SERIOUS COMORBIDITY AND BODY MASS INDEX (BMI) OF 31.0 TO 31.9 IN ADULT: ICD-10-CM

## 2024-06-25 DIAGNOSIS — K21.9 GASTROESOPHAGEAL REFLUX DISEASE WITHOUT ESOPHAGITIS: ICD-10-CM

## 2024-06-25 DIAGNOSIS — E78.5 DYSLIPIDEMIA, GOAL LDL BELOW 70: ICD-10-CM

## 2024-06-25 DIAGNOSIS — E78.2 MIXED HYPERLIPIDEMIA: ICD-10-CM

## 2024-06-25 DIAGNOSIS — I48.0 PAROXYSMAL ATRIAL FIBRILLATION (MULTI): ICD-10-CM

## 2024-06-25 DIAGNOSIS — Z00.00 MEDICARE ANNUAL WELLNESS VISIT, SUBSEQUENT: Primary | ICD-10-CM

## 2024-06-25 DIAGNOSIS — G47.33 OSA ON CPAP: ICD-10-CM

## 2024-06-25 DIAGNOSIS — I10 BENIGN ESSENTIAL HYPERTENSION: ICD-10-CM

## 2024-06-25 PROCEDURE — 99497 ADVNCD CARE PLAN 30 MIN: CPT | Performed by: INTERNAL MEDICINE

## 2024-06-25 PROCEDURE — 1036F TOBACCO NON-USER: CPT | Performed by: INTERNAL MEDICINE

## 2024-06-25 PROCEDURE — 1123F ACP DISCUSS/DSCN MKR DOCD: CPT | Performed by: INTERNAL MEDICINE

## 2024-06-25 PROCEDURE — 1158F ADVNC CARE PLAN TLK DOCD: CPT | Performed by: INTERNAL MEDICINE

## 2024-06-25 PROCEDURE — 1170F FXNL STATUS ASSESSED: CPT | Performed by: INTERNAL MEDICINE

## 2024-06-25 PROCEDURE — 1159F MED LIST DOCD IN RCRD: CPT | Performed by: INTERNAL MEDICINE

## 2024-06-25 PROCEDURE — 99214 OFFICE O/P EST MOD 30 MIN: CPT | Performed by: INTERNAL MEDICINE

## 2024-06-25 PROCEDURE — 99397 PER PM REEVAL EST PAT 65+ YR: CPT | Performed by: INTERNAL MEDICINE

## 2024-06-25 PROCEDURE — G0446 INTENS BEHAVE THER CARDIO DX: HCPCS | Performed by: INTERNAL MEDICINE

## 2024-06-25 PROCEDURE — G0439 PPPS, SUBSEQ VISIT: HCPCS | Performed by: INTERNAL MEDICINE

## 2024-06-25 PROCEDURE — G0444 DEPRESSION SCREEN ANNUAL: HCPCS | Performed by: INTERNAL MEDICINE

## 2024-06-25 PROCEDURE — 3078F DIAST BP <80 MM HG: CPT | Performed by: INTERNAL MEDICINE

## 2024-06-25 PROCEDURE — 3008F BODY MASS INDEX DOCD: CPT | Performed by: INTERNAL MEDICINE

## 2024-06-25 PROCEDURE — 3074F SYST BP LT 130 MM HG: CPT | Performed by: INTERNAL MEDICINE

## 2024-06-25 PROCEDURE — 1160F RVW MEDS BY RX/DR IN RCRD: CPT | Performed by: INTERNAL MEDICINE

## 2024-06-25 RX ORDER — AMLODIPINE BESYLATE 5 MG/1
5 TABLET ORAL DAILY
Qty: 90 TABLET | Refills: 3 | Status: SHIPPED | OUTPATIENT
Start: 2024-06-25

## 2024-06-25 RX ORDER — LOSARTAN POTASSIUM AND HYDROCHLOROTHIAZIDE 25; 100 MG/1; MG/1
1 TABLET ORAL DAILY
Qty: 90 TABLET | Refills: 3 | Status: SHIPPED | OUTPATIENT
Start: 2024-06-25

## 2024-06-25 RX ORDER — OMEPRAZOLE 20 MG/1
20 CAPSULE, DELAYED RELEASE ORAL DAILY
Qty: 90 CAPSULE | Refills: 3 | Status: SHIPPED | OUTPATIENT
Start: 2024-06-25

## 2024-06-25 RX ORDER — PRAVASTATIN SODIUM 40 MG/1
40 TABLET ORAL DAILY
Qty: 90 TABLET | Refills: 3 | Status: SHIPPED | OUTPATIENT
Start: 2024-06-25

## 2024-06-25 RX ORDER — SILDENAFIL 100 MG/1
100 TABLET, FILM COATED ORAL AS NEEDED
Qty: 10 TABLET | Refills: 3 | Status: SHIPPED | OUTPATIENT
Start: 2024-06-25

## 2024-06-25 RX ORDER — METOPROLOL SUCCINATE 50 MG/1
50 TABLET, EXTENDED RELEASE ORAL DAILY
Qty: 90 TABLET | Refills: 3 | Status: SHIPPED | OUTPATIENT
Start: 2024-06-25

## 2024-06-25 ASSESSMENT — ANXIETY QUESTIONNAIRES
3. WORRYING TOO MUCH ABOUT DIFFERENT THINGS: NOT AT ALL
4. TROUBLE RELAXING: NOT AT ALL
6. BECOMING EASILY ANNOYED OR IRRITABLE: NOT AT ALL
GAD7 TOTAL SCORE: 0
7. FEELING AFRAID AS IF SOMETHING AWFUL MIGHT HAPPEN: NOT AT ALL
1. FEELING NERVOUS, ANXIOUS, OR ON EDGE: NOT AT ALL
2. NOT BEING ABLE TO STOP OR CONTROL WORRYING: NOT AT ALL
IF YOU CHECKED OFF ANY PROBLEMS ON THIS QUESTIONNAIRE, HOW DIFFICULT HAVE THESE PROBLEMS MADE IT FOR YOU TO DO YOUR WORK, TAKE CARE OF THINGS AT HOME, OR GET ALONG WITH OTHER PEOPLE: NOT DIFFICULT AT ALL
5. BEING SO RESTLESS THAT IT IS HARD TO SIT STILL: NOT AT ALL

## 2024-06-25 ASSESSMENT — ACTIVITIES OF DAILY LIVING (ADL)
BATHING: INDEPENDENT
TAKING_MEDICATION: INDEPENDENT
DRESSING: INDEPENDENT
MANAGING_FINANCES: INDEPENDENT
DOING_HOUSEWORK: INDEPENDENT
GROCERY_SHOPPING: INDEPENDENT

## 2024-06-25 ASSESSMENT — ENCOUNTER SYMPTOMS
LOSS OF SENSATION IN FEET: 0
DEPRESSION: 0
OCCASIONAL FEELINGS OF UNSTEADINESS: 0

## 2024-06-25 NOTE — ASSESSMENT & PLAN NOTE
LDL cholesterol reasonable at 87 on pravastatin 40 mg daily patient with significant family history of coronary artery disease and risk meets criteria for CT calcium score for further address dyslipidemia and to look for early silent disease currently asymptomatic continue to monitor

## 2024-06-25 NOTE — PROGRESS NOTES
Depression and anxiety screening completed   PHQ9 score   GAD7 score   I spent 15 minutes obtaining and discussing depression screening using PHQ 2 questions with results documented in chart.  Screening using PHQ-9 and KAYLAN-7 scores were used for follow-up with treatment and referral plan discussed.      I spent 15 minutes face-to-face with this individual discussing the cardiovascular risk and behavioral therapies of nutritional choices exercise and elimination of habits contributing to risk .We agreed on a plan how they may be able to reduce  current cardiovascular risk.  Per patient with calculation greater than 10% aspirin use was discussed and encouraged unless known allergy or increased risk of bleeding contraindicates use patient's 10-year CV risk estimate calculates:I spent greater than 15 minutes discussing advance care planning including the explanation and discussion of advanced directives.  If patient does not have current up-to-date documents examples and information provided on how to create both living will and power of . [facility] toolkit was given to patient and was encouraged to work out completing these documents.Subjective   Reason for Visit: Gerhard Templeton is an 74 y.o. male here for a Medicare Wellness visit.     Past Medical, Surgical, and Family History reviewed and updated in chart.    Reviewed all medications by prescribing practitioner or clinical pharmacist (such as prescriptions, OTCs, herbal therapies and supplements) and documented in the medical record.    No acute complaints feeling very well has lost 14 pounds through improvement in diet and exercise changes asymptomatic atrial fibrillation lab work optimal.  Patient with strong family history of father with acute MI acute coronary syndrome sudden cardiac death at age 46 brother otherwise very healthy recently had acute coronary syndrome with LAD stent sister also had  maker in the setting of established cardiovascular  "disease patient inquires about calcium CT scoring to further estimate risk he is on a very good primary prevention regimen recent lab work revealed elevated bilirubin of 1.9 without any evidence of cholestatic process or symptoms compliant with CPAP and otherwise doing well        Patient Care Team:  Kavon Shepherd DO as PCP - General  Kavon Shepherd DO as PCP - [employer]vantage PCP     Review of Systems   All other systems reviewed and are negative.      Objective   Vitals:  /72   Pulse 58   Ht 1.702 m (5' 7\")   Wt 83.5 kg (184 lb)   BMI 28.82 kg/m²       Physical Exam  Vitals and nursing note reviewed.   Constitutional:       General: He is not in acute distress.     Appearance: Normal appearance. He is well-developed. He is not toxic-appearing.   HENT:      Head: Normocephalic and atraumatic.      Right Ear: Tympanic membrane and external ear normal.      Left Ear: Tympanic membrane and external ear normal.      Nose: Nose normal.      Mouth/Throat:      Mouth: Mucous membranes are moist.      Pharynx: Oropharynx is clear. No oropharyngeal exudate or posterior oropharyngeal erythema.      Tonsils: No tonsillar exudate. 2+ on the right. 2+ on the left.   Eyes:      Extraocular Movements: Extraocular movements intact.      Conjunctiva/sclera: Conjunctivae normal.   Cardiovascular:      Rate and Rhythm: Normal rate. Rhythm irregular.      Pulses: Normal pulses.      Heart sounds: Normal heart sounds. No murmur heard.  Pulmonary:      Effort: Pulmonary effort is normal.      Breath sounds: Normal breath sounds.   Abdominal:      General: Abdomen is flat. Bowel sounds are normal.      Palpations: Abdomen is soft.   Musculoskeletal:      Cervical back: Neck supple.   Feet:      Right foot:      Skin integrity: Skin integrity normal. No ulcer, blister, skin breakdown, erythema, warmth or callus.      Toenail Condition: Right toenails are normal.      Left foot:      Skin integrity: Skin " integrity normal. No ulcer, blister, skin breakdown, erythema, warmth or callus.      Toenail Condition: Left toenails are normal.   Lymphadenopathy:      Cervical: No cervical adenopathy.   Skin:     General: Skin is warm and dry.      Capillary Refill: Capillary refill takes more than 3 seconds.      Findings: No rash.   Neurological:      Mental Status: He is alert. Mental status is at baseline.      Sensory: Sensation is intact.   Psychiatric:         Mood and Affect: Mood normal.         Behavior: Behavior normal.         Thought Content: Thought content normal.         Judgment: Judgment normal.         Assessment/Plan   Problem List Items Addressed This Visit       Benign essential hypertension    Current Assessment & Plan     Optimal blood pressure control continue losartan hydrochlorothiazide 100/25 1 tablet daily with amlodipine 5 mg daily and metoprolol succinate ER 50 mg daily continue         Relevant Medications    amLODIPine (Norvasc) 5 mg tablet    losartan-hydrochlorothiazide (Hyzaar) 100-25 mg tablet    metoprolol succinate XL (Toprol-XL) 50 mg 24 hr tablet    omeprazole (PriLOSEC) 20 mg DR capsule    Other Relevant Orders    CT cardiac scoring wo IV contrast    Hepatic function panel    Follow Up In [facility] - PCP - Established    GERD (gastroesophageal reflux disease)    Current Assessment & Plan     Symptoms controlled asymptomatic on omeprazole 20 mg daily         Dyslipidemia, goal LDL below 70    Current Assessment & Plan     LDL cholesterol reasonable at 87 on pravastatin 40 mg daily patient with significant family history of coronary artery disease and risk meets criteria for CT calcium score for further address dyslipidemia and to look for early silent disease currently asymptomatic continue to monitor           Relevant Medications    pravastatin (Pravachol) 40 mg tablet    Male erectile disorder of organic origin    Current Assessment & Plan     Refill of sildenafil as  directed         Relevant Medications    sildenafil (Viagra) 100 mg tablet    ROSA on CPAP    Current Assessment & Plan     Compliant with regular use of CPAP on a nightly basis no reports of daytime hypersomnolence changes in rhythm disturbance or shortness of breath no arrhythmias reported stable         Paroxysmal atrial fibrillation (Multi)    Current Assessment & Plan     Rate controlled asymptomatic irregular rhythm with heart rate in the 60 range no signs or symptoms of congestive heart failure follows with cardiologist continue apixaban 5 mg twice a day for stroke prophylaxis follows with cardiologist on an annual basis in October         Relevant Medications    amLODIPine (Norvasc) 5 mg tablet    apixaban (Eliquis) 5 mg tablet    metoprolol succinate XL (Toprol-XL) 50 mg 24 hr tablet    sildenafil (Viagra) 100 mg tablet    Other Relevant Orders    CT cardiac scoring wo IV contrast    Hepatic function panel    Follow Up In [facility] - PCP - Established    Medicare annual wellness visit, subsequent - Primary    Current Assessment & Plan     Up-to-date with vaccinations and screenings Cologuard testing completed July 10, 2023 negative.  Discussed advance medical directives with wife as medical power of  follow-up in 6 months         BMI 28.0-28.9,adult    Current Assessment & Plan     14 pound weight loss improvement in BMI to 28 through diet and lifestyle changes exercise doing very well asymptomatic         Elevated bilirubin    Current Assessment & Plan     Repeat level asymptomatic consider ultrasound of gallbladder to evaluate for gallstone pathology if persistently elevated not associated with cholestatic process or elevated liver functions may be transient

## 2024-06-25 NOTE — ASSESSMENT & PLAN NOTE
14 pound weight loss improvement in BMI to 28 through diet and lifestyle changes exercise doing very well asymptomatic

## 2024-06-25 NOTE — ASSESSMENT & PLAN NOTE
Compliant with regular use of CPAP on a nightly basis no reports of daytime hypersomnolence changes in rhythm disturbance or shortness of breath no arrhythmias reported stable

## 2024-06-25 NOTE — ASSESSMENT & PLAN NOTE
Rate controlled asymptomatic irregular rhythm with heart rate in the 60 range no signs or symptoms of congestive heart failure follows with cardiologist continue apixaban 5 mg twice a day for stroke prophylaxis follows with cardiologist on an annual basis in October

## 2024-06-25 NOTE — ASSESSMENT & PLAN NOTE
Repeat level asymptomatic consider ultrasound of gallbladder to evaluate for gallstone pathology if persistently elevated not associated with cholestatic process or elevated liver functions may be transient

## 2024-06-25 NOTE — ASSESSMENT & PLAN NOTE
Optimal blood pressure control continue losartan hydrochlorothiazide 100/25 1 tablet daily with amlodipine 5 mg daily and metoprolol succinate ER 50 mg daily continue

## 2024-06-25 NOTE — ASSESSMENT & PLAN NOTE
Up-to-date with vaccinations and screenings Cologuard testing completed July 10, 2023 negative.  Discussed advance medical directives with wife as medical power of  follow-up in 6 months

## 2024-06-25 NOTE — PROGRESS NOTES
"Subjective   Reason for Visit: Gerhard Templeton is an 74 y.o. male here for a Medicare Wellness visit.               HPI    Patient Care Team:  Kavon Shepherd DO as PCP - General  Kavon Shepherd DO as PCP - [employer]vantage PCP     Review of Systems    Objective   Vitals:  /72   Pulse 58   Ht 1.702 m (5' 7\")   Wt 83.5 kg (184 lb)   BMI 28.82 kg/m²       Physical Exam    Assessment/Plan   Problem List Items Addressed This Visit       Benign essential hypertension    Hyperlipidemia    Male erectile disorder of organic origin    Paroxysmal atrial fibrillation (Multi)    Class 1 obesity due to excess calories with serious comorbidity and body mass index (BMI) of 31.0 to 31.9 in adult     Other Visit Diagnoses       Hypogonadism in male                   "

## 2024-06-27 ENCOUNTER — TELEPHONE (OUTPATIENT)
Dept: PRIMARY CARE | Facility: CLINIC | Age: 74
End: 2024-06-27

## 2024-06-27 ENCOUNTER — LAB (OUTPATIENT)
Dept: LAB | Facility: LAB | Age: 74
End: 2024-06-27
Payer: MEDICARE

## 2024-06-27 DIAGNOSIS — E78.2 MIXED HYPERLIPIDEMIA: ICD-10-CM

## 2024-06-27 DIAGNOSIS — I10 BENIGN ESSENTIAL HYPERTENSION: ICD-10-CM

## 2024-06-27 DIAGNOSIS — I48.0 PAROXYSMAL ATRIAL FIBRILLATION (MULTI): ICD-10-CM

## 2024-06-27 LAB
ALBUMIN SERPL BCP-MCNC: 4.4 G/DL (ref 3.4–5)
ALP SERPL-CCNC: 53 U/L (ref 33–136)
ALT SERPL W P-5'-P-CCNC: 16 U/L (ref 10–52)
AST SERPL W P-5'-P-CCNC: 13 U/L (ref 9–39)
BILIRUB DIRECT SERPL-MCNC: 0.2 MG/DL (ref 0–0.3)
BILIRUB SERPL-MCNC: 1.2 MG/DL (ref 0–1.2)
PROT SERPL-MCNC: 7.1 G/DL (ref 6.4–8.2)

## 2024-06-27 PROCEDURE — 36415 COLL VENOUS BLD VENIPUNCTURE: CPT

## 2024-06-27 PROCEDURE — 80076 HEPATIC FUNCTION PANEL: CPT

## 2024-06-27 NOTE — TELEPHONE ENCOUNTER
----- Message from Kavon Shepherd DO sent at 6/27/2024  3:42 PM EDT -----  Bilirubin returning to normal levels at 1.2 we will continue to monitor stable

## 2024-07-19 ENCOUNTER — TELEPHONE (OUTPATIENT)
Dept: PRIMARY CARE | Facility: CLINIC | Age: 74
End: 2024-07-19
Payer: MEDICARE

## 2024-07-19 DIAGNOSIS — N52.9 MALE ERECTILE DISORDER OF ORGANIC ORIGIN: ICD-10-CM

## 2024-07-19 RX ORDER — SILDENAFIL 100 MG/1
100 TABLET, FILM COATED ORAL AS NEEDED
Qty: 10 TABLET | Refills: 3 | Status: SHIPPED | OUTPATIENT
Start: 2024-07-19

## 2024-07-19 NOTE — TELEPHONE ENCOUNTER
Med Refill   sildenafil (Viagra) 100 mg tablet [[id number]]     [facility] in [place]     LOV: 6/25/24    NOV: 1/2/25

## 2024-09-19 ENCOUNTER — HOSPITAL ENCOUNTER (OUTPATIENT)
Dept: RADIOLOGY | Facility: CLINIC | Age: 74
Discharge: HOME | End: 2024-09-19
Payer: MEDICARE

## 2024-09-19 DIAGNOSIS — I10 BENIGN ESSENTIAL HYPERTENSION: ICD-10-CM

## 2024-09-19 DIAGNOSIS — E78.2 MIXED HYPERLIPIDEMIA: ICD-10-CM

## 2024-09-19 DIAGNOSIS — I48.0 PAROXYSMAL ATRIAL FIBRILLATION (MULTI): ICD-10-CM

## 2024-09-19 PROCEDURE — 75571 CT HRT W/O DYE W/CA TEST: CPT

## 2024-09-26 ENCOUNTER — TELEPHONE (OUTPATIENT)
Dept: PRIMARY CARE | Facility: CLINIC | Age: 74
End: 2024-09-26
Payer: MEDICARE

## 2024-09-26 DIAGNOSIS — R93.1 ELEVATED CORONARY ARTERY CALCIUM SCORE: Primary | ICD-10-CM

## 2024-09-26 DIAGNOSIS — I20.89 ATYPICAL ANGINA (CMS-HCC): ICD-10-CM

## 2024-09-26 NOTE — TELEPHONE ENCOUNTER
Patient is asking what are his next steps will be following the results of his CT cardiac scoring that he had completed on 9/19/24.

## 2024-09-30 ENCOUNTER — TELEPHONE (OUTPATIENT)
Dept: CARDIOLOGY | Facility: HOSPITAL | Age: 74
End: 2024-09-30
Payer: MEDICARE

## 2024-09-30 NOTE — TELEPHONE ENCOUNTER
Pateint had calcium test scor 801, Dr. Davies has ordered a stress test, patient wants our care team to contact to go over his concerns.

## 2024-10-04 NOTE — TELEPHONE ENCOUNTER
Called back to discuss.  He isn't sure what his question is right now.  Sounds like he stopped by the office and talked with someone.  He feels his questions were answered.

## 2024-10-07 ENCOUNTER — TELEPHONE (OUTPATIENT)
Dept: PRIMARY CARE | Facility: CLINIC | Age: 74
End: 2024-10-07
Payer: MEDICARE

## 2024-10-07 NOTE — TELEPHONE ENCOUNTER
Dr. Shepherd ordered a stress echo due to patient calcium score testing and family history.   Insurance denied coverage and per Dr. Shepherd he should see cardiology first and ask them to order this that it might be covered from the cardiology order.     Patient was notified to cancel the test and he states he will and he see cardiology on the 15th of October 2024

## 2024-10-10 ENCOUNTER — APPOINTMENT (OUTPATIENT)
Dept: CARDIOLOGY | Facility: HOSPITAL | Age: 74
End: 2024-10-10
Payer: MEDICARE

## 2024-10-15 ENCOUNTER — APPOINTMENT (OUTPATIENT)
Dept: CARDIOLOGY | Facility: HOSPITAL | Age: 74
End: 2024-10-15
Payer: MEDICARE

## 2024-10-15 VITALS
HEART RATE: 68 BPM | BODY MASS INDEX: 28.72 KG/M2 | SYSTOLIC BLOOD PRESSURE: 138 MMHG | WEIGHT: 183 LBS | HEIGHT: 67 IN | DIASTOLIC BLOOD PRESSURE: 80 MMHG

## 2024-10-15 DIAGNOSIS — R07.89 ATYPICAL CHEST PAIN: ICD-10-CM

## 2024-10-15 DIAGNOSIS — I10 BENIGN ESSENTIAL HYPERTENSION: Primary | ICD-10-CM

## 2024-10-15 DIAGNOSIS — R93.1 ELEVATED CORONARY ARTERY CALCIUM SCORE: ICD-10-CM

## 2024-10-15 DIAGNOSIS — E78.5 DYSLIPIDEMIA, GOAL LDL BELOW 70: ICD-10-CM

## 2024-10-15 PROBLEM — I48.0 PAROXYSMAL ATRIAL FIBRILLATION (MULTI): Status: RESOLVED | Noted: 2023-04-17 | Resolved: 2024-10-15

## 2024-10-15 PROCEDURE — 93005 ELECTROCARDIOGRAM TRACING: CPT | Performed by: INTERNAL MEDICINE

## 2024-10-15 PROCEDURE — 99215 OFFICE O/P EST HI 40 MIN: CPT | Performed by: INTERNAL MEDICINE

## 2024-10-15 RX ORDER — ROSUVASTATIN CALCIUM 10 MG/1
10 TABLET, COATED ORAL DAILY
Qty: 30 TABLET | Refills: 11 | Status: SHIPPED | OUTPATIENT
Start: 2024-10-15 | End: 2025-10-15

## 2024-10-15 ASSESSMENT — ENCOUNTER SYMPTOMS
LOSS OF SENSATION IN FEET: 0
OCCASIONAL FEELINGS OF UNSTEADINESS: 0
DEPRESSION: 0

## 2024-10-15 NOTE — PROGRESS NOTES
"Chief Complaint:   No chief complaint on file.     History Of Present Illness:    Gerhard Templeton is a 74 y.o. male presenting for annual follow-up  (pronounced \" Like- liter\") .  He has a past medical history of paroxysmal atrial fibrillation and hypertension. The patient was diagnosed with paroxysmal atrial fibrillation when he presented for his second knee operation. He spontaneously converted to sinus rhythm and did not need cardioversion. With a [id number] of 2 he was placed on anticoagulation therapy. He is also on beta blockers for ventricular rate control and antihypertensive effect. The patient tells me that he is feeling well without chest pain, dyspnea, orthopnea, PND, palpitation, lightheadedness, syncope, or edema. No claudication. He has been taking his medications as prescribed and without adverse effect.  He was unable to tolerate the full dose of pravastatin and has been cut in half.  His LDL is around 80s.  Recently his PCP ordered a CT calcium score which was elevated at 801.  He states he has been having occasional heartburn type symptoms unrelated to activities.  He is concerned because his brother who is 6 years younger needed a quadruple bypass surgery because of similar symptoms.     EKG is atrial fibrillation with controlled ventricular response with low voltage in the precordial leads. Incomplete right bundle branch block. Left atrial enlargement. LVH. Unchanged.      Echocardiogram in March 2017 with normal left and right ventricular systolic function. Mild mitral regurgitation. Mild tricuspid regurgitation.     Regadenoson myocardial perfusion scan in March 2017 showed normal perfusion.        Last Recorded Vitals:  Vitals:    10/15/24 1507   BP: 138/80   BP Location: Left arm   Pulse: 68   Weight: 83 kg (183 lb)   Height: 1.702 m (5' 7\")       Past Medical History:  He has a past medical history of Encounter for preprocedural cardiovascular examination (03/15/2017), Obstructive " sleep apnea (adult) (pediatric), Personal history of other diseases of the circulatory system (03/07/2019), Personal history of other diseases of the digestive system, and Personal history of other diseases of the musculoskeletal system and connective tissue.    Past Surgical History:  He has a past surgical history that includes Total knee arthroplasty (03/10/2017); Other surgical history (03/10/2017); Other surgical history (03/10/2017); and Rotator cuff repair (03/15/2017).      Social History:  He reports that he has never smoked. He has never used smokeless tobacco. He reports that he does not currently use alcohol. He reports that he does not use drugs.    Family History:  Family History   Problem Relation Name Age of Onset    Atrial fibrillation Mother      Other (CVA) Mother      Coronary artery disease Father      Heart attack Father          Allergies:  Patient has no known allergies.    Outpatient Medications:  Current Outpatient Medications   Medication Instructions    amLODIPine (NORVASC) 5 mg, oral, Daily    apixaban (ELIQUIS) 5 mg, oral, 2 times daily    losartan-hydrochlorothiazide (Hyzaar) 100-25 mg tablet 1 tablet, oral, Daily    metoprolol succinate XL (TOPROL-XL) 50 mg, oral, Daily    omeprazole (PRILOSEC) 20 mg, oral, Daily    pravastatin (PRAVACHOL) 40 mg, oral, Daily    sildenafil (VIAGRA) 100 mg, oral, As needed       Physical Exam:  Physical Exam  Vitals reviewed.   Constitutional:       Appearance: Normal appearance.   Neck:      Vascular: No carotid bruit or JVD.   Cardiovascular:      Rate and Rhythm: Normal rate. Rhythm irregular.      Heart sounds: Normal heart sounds, S1 normal and S2 normal. No murmur heard.     Comments: Trivial ankle swelling bilaterally.  Pulmonary:      Effort: Pulmonary effort is normal.      Breath sounds: Normal breath sounds.   Abdominal:      General: Abdomen is flat. Bowel sounds are normal.      Palpations: Abdomen is soft.   Musculoskeletal:      Right  "lower leg: Edema present.      Left lower leg: Edema present.   Skin:     General: Skin is warm.   Neurological:      Mental Status: He is alert. Mental status is at baseline.   Psychiatric:         Mood and Affect: Mood normal.         Behavior: Behavior normal.           Last Labs:  CBC -  Lab Results   Component Value Date    WBC 5.4 06/10/2024    HGB 15.5 06/10/2024    HCT 45.4 06/10/2024    MCV 87 06/10/2024     06/10/2024       CMP -  Lab Results   Component Value Date    CALCIUM 9.3 06/10/2024    PROT 7.1 06/27/2024    ALBUMIN 4.4 06/27/2024    AST 13 06/27/2024    ALT 16 06/27/2024    ALKPHOS 53 06/27/2024    BILITOT 1.2 06/27/2024       LIPID PANEL -   Lab Results   Component Value Date    CHOL 141 06/10/2024    TRIG 94 06/10/2024    HDL 35.2 06/10/2024    CHHDL 4.0 06/10/2024    LDLF 89 06/12/2023    VLDL 19 06/10/2024    NHDL 106 06/10/2024       RENAL FUNCTION PANEL -   Lab Results   Component Value Date    GLUCOSE 80 06/10/2024     06/10/2024    K 4.0 06/10/2024     06/10/2024    CO2 28 06/10/2024    ANIONGAP 12 06/10/2024    BUN 13 06/10/2024    CREATININE 0.98 06/10/2024    GFRMALE 76 06/12/2023    CALCIUM 9.3 06/10/2024    ALBUMIN 4.4 06/27/2024        No results found for: \"BNP\", \"HGBA1C\"    Last Cardiology Tests:  ECG:  ECG 12 lead (Clinic Performed) 10/26/2023      Echo:  No results found for this or any previous visit from the past 1095 days.      Ejection Fractions:  No results found for: \"EF\"    Cath:  No results found for this or any previous visit from the past 1095 days.      Stress Test:  No results found for this or any previous visit from the past 1095 days.      Cardiac Imaging:  CT cardiac scoring wo IV contrast 09/19/2024          Assessment/Plan     1-The patient has hyperlipidemia.  With elevated calcium score he should be on a high-dose potent statin with lower LDL targets.    He is unable to tolerate higher dose of pravastatin.  I offered switching this to " Crestor and if he does not tolerate that I would recommend continuing 40 mg pravastatin with Zetia 10 mg.  For now he will try 10 mg a day of Crestor.    2-Hypertension - patient has hypertension which is well-controlled on the current regimen. Blood pressure goal is less than 140/90 and preferably towards 120/70.  Blood pressure is at goal most of the time.  Patient will continue the regimen. Advised to keep home blood pressure log.    3-Atypical chest pain-with calcium score of 801, increased 10-year risk of atherosclerotic vascular disease.  Also has shortness of breath with strenuous physical activities.  Recommend checking stress test for further risk stratification.      4-The patient has paroxysmal atrial fibrillation but seems to be more persistent now.  He is asymptomatic.. He is [id number] 2 , remains on OAC.  Plan rate control only at this point.    5-Elevated CT calcium score-by Curtis 10-year risk of atherosclerotic vascular disease is 26%, with coronary age of 93.  Discussed with patient recommending high-dose potent statins, exercise stress testing because of atypical chest symptoms.  Also target blood pressure less than 130/80.  Lifestyle changes also discussed.    Katie Narayanan MD

## 2024-10-29 ENCOUNTER — APPOINTMENT (OUTPATIENT)
Dept: CARDIOLOGY | Facility: CLINIC | Age: 74
End: 2024-10-29
Payer: MEDICARE

## 2024-11-04 ENCOUNTER — DOCUMENTATION (OUTPATIENT)
Dept: CARDIOLOGY | Facility: HOSPITAL | Age: 74
End: 2024-11-04

## 2024-11-04 ENCOUNTER — HOSPITAL ENCOUNTER (OUTPATIENT)
Dept: CARDIOLOGY | Facility: HOSPITAL | Age: 74
Discharge: HOME | End: 2024-11-04
Payer: MEDICARE

## 2024-11-04 DIAGNOSIS — R07.89 ATYPICAL CHEST PAIN: ICD-10-CM

## 2024-11-04 DIAGNOSIS — R93.1 ELEVATED CORONARY ARTERY CALCIUM SCORE: ICD-10-CM

## 2024-11-04 PROCEDURE — 93017 CV STRESS TEST TRACING ONLY: CPT

## 2024-11-04 PROCEDURE — 2500000004 HC RX 250 GENERAL PHARMACY W/ HCPCS (ALT 636 FOR OP/ED): Performed by: INTERNAL MEDICINE

## 2024-11-04 PROCEDURE — 93016 CV STRESS TEST SUPVJ ONLY: CPT | Performed by: INTERNAL MEDICINE

## 2024-11-04 PROCEDURE — 93350 STRESS TTE ONLY: CPT | Performed by: INTERNAL MEDICINE

## 2024-11-04 PROCEDURE — 93018 CV STRESS TEST I&R ONLY: CPT | Performed by: INTERNAL MEDICINE

## 2024-11-04 NOTE — RESULT ENCOUNTER NOTE
Even though stress test was normal, he had classical anginal type of symptoms with exercise.  It may be best to evaluate his coronaries with a coronary CT angiogram as a result.  Alternative is cardiac catheterization.  Please set up a follow-up with JOHNNY soon to discuss.

## 2024-11-04 NOTE — PROGRESS NOTES
11/4/24  0939  Patient stopped in to clinic to report itching all over his body ever since he started on Crestor.    Instructed patient to stop Crestor and that nurse will inform Dr. Narayanan for next step.    Patient verbalized understanding.

## 2024-11-05 ENCOUNTER — TELEPHONE (OUTPATIENT)
Dept: CARDIOLOGY | Facility: HOSPITAL | Age: 74
End: 2024-11-05
Payer: MEDICARE

## 2024-11-05 DIAGNOSIS — E78.5 DYSLIPIDEMIA, GOAL LDL BELOW 70: Primary | ICD-10-CM

## 2024-11-05 RX ORDER — ATORVASTATIN CALCIUM 40 MG/1
40 TABLET, FILM COATED ORAL DAILY
Qty: 30 TABLET | Refills: 11 | Status: SHIPPED | OUTPATIENT
Start: 2024-11-05 | End: 2025-11-05

## 2024-11-05 NOTE — TELEPHONE ENCOUNTER
Patient and patients wife called in to schedule a follow to the stress test to go over the results, and to discuss possible angiogram or cath. Scheduled appointment Dr. Narayanan nurse.

## 2024-11-05 NOTE — TELEPHONE ENCOUNTER
11/5/24  1203  Called patient and patient reports itching better.    Informed of medication change from Crestor to Atorvastatin 40 mg daily; informed okay to wait a full week to start atorvastatin.    Patient and wife verbalized understanding.    New Rx for atorvastatin sent for approval.    ----- Message from Katie Narayanan sent at 11/4/2024  4:19 PM EST -----  1.-Please naeem that as allergy if stopping Crestor stop the itching.    2-please have him try Lipitor 40 mg a day.  ----- Message -----  From: Misha Mendieta RN  Sent: 11/4/2024   9:41 AM EST  To: Katie Narayanan MD    This patient stopped in today to report severe itching all over his body since he started the Crestor.    I told him to stop taking it and that I would let you know.

## 2024-11-05 NOTE — TELEPHONE ENCOUNTER
----- Message from Katie Narayanan sent at 11/4/2024  4:58 PM EST -----  Even though stress test was normal, he had classical anginal type of symptoms with exercise.  It may be best to evaluate his coronaries with a coronary CT angiogram as a result.  Alternative is cardiac catheterization.  Please set up a follow-up with JOHNNY soon to discuss.

## 2024-11-05 NOTE — TELEPHONE ENCOUNTER
Called him with results and plan.  He chose to have the call on speaker phone with his wife.  He has an appointment with Chela on 11/25.  Advised him not to engage in strenuous activity until testing is complete.  Offered to move up his visit but he didn't feel that was necessary.      He says he stopped by the office because he got itching and a rash after starting Crestor.  He stopped and has been off it for 2 days.  Rash is improving.  Dr. Narayanan recommends holding until rash is completely resolved and then restarting to confirm it is the Crestor.  He verbalized understanding and is agreeable to the plan.

## 2024-11-20 NOTE — PROGRESS NOTES
Chief Complaint/Reason for Visit:   Patient is coming in today for a one month cardiovascular follow up.     History Of Present Illness:    Mr Templeton is coming in today as a 1 month cardiovascular follow-up.  We have followed this patient previously for atrial fibrillation, hypertension, dyslipidemia, abnormal coronary calcium score, and atypical chest pain.  He was scheduled for a stress echo which was done November 4, 2024 showing no EKG or echo evidence for ischemia at maximal workload.  He did have some anginal type symptoms with exercise.  Plan will be to discuss coronary CTA versus heart catheterization.  Patient also had reported a rash with itching that he attributed to Crestor.  He was advised to take a brief holiday from the medication to see if he had any improvement.    Patient presents today with his wife.  He has rash resolved after stopping rosuvastatin and is now taking atorvastatin without problems.  He has not been having any chest pain recently but tells me he has been trying to minimize his activity.  He did have significant chest pressure approximately 6 weeks ago with activity and again during his stress test.  He also has 2 younger siblings 1 of which had stents the other was sent for urgent bypass surgery this year.       Past Medical History:  He has a past medical history of Encounter for preprocedural cardiovascular examination (03/15/2017), Obstructive sleep apnea (adult) (pediatric), Personal history of other diseases of the circulatory system (03/07/2019), Personal history of other diseases of the digestive system, and Personal history of other diseases of the musculoskeletal system and connective tissue.    Past Surgical History:  He has a past surgical history that includes Total knee arthroplasty (03/10/2017); Other surgical history (03/10/2017); Other surgical history (03/10/2017); and Rotator cuff repair (03/15/2017).      Social History:  He reports that he has never smoked.  "He has never used smokeless tobacco. He reports that he does not currently use alcohol. He reports that he does not use drugs.    Family History:  Family History   Problem Relation Name Age of Onset    Atrial fibrillation Mother      Other (CVA) Mother      Coronary artery disease Father      Heart attack Father          Allergies:  Crestor [rosuvastatin]    Medications:  Current Outpatient Medications   Medication Instructions    amLODIPine (NORVASC) 5 mg, oral, Daily    apixaban (ELIQUIS) 5 mg, oral, 2 times daily    atorvastatin (LIPITOR) 40 mg, oral, Daily    losartan-hydrochlorothiazide (Hyzaar) 100-25 mg tablet 1 tablet, oral, Daily    metoprolol succinate XL (TOPROL-XL) 50 mg, oral, Daily    omeprazole (PRILOSEC) 20 mg, oral, Daily    sildenafil (VIAGRA) 100 mg, oral, As needed       Review of Systems:  Review of Systems   Constitutional: Negative. Negative for decreased appetite and malaise/fatigue.   HENT: Negative.     Eyes:  Negative for blurred vision and visual disturbance.   Cardiovascular:  Positive for chest pain. Negative for dyspnea on exertion, irregular heartbeat, leg swelling, orthopnea, palpitations and syncope.   Respiratory: Negative.  Negative for cough and shortness of breath.    Musculoskeletal:  Negative for arthritis and falls.   Gastrointestinal: Negative.    Neurological:  Positive for light-headedness. Negative for focal weakness.   Psychiatric/Behavioral:  Negative for depression and memory loss.         Vitals  Visit Vitals  /64 (BP Location: Left arm, Patient Position: Sitting, BP Cuff Size: Adult)   Pulse 82   Ht 1.702 m (5' 7\")   Wt 84.8 kg (187 lb)   SpO2 97%   BMI 29.29 kg/m²   Smoking Status Never   BSA 2 m²        Physical Exam:  Physical Exam  Constitutional:       Appearance: Normal appearance.   HENT:      Head: Normocephalic.   Eyes:      Conjunctiva/sclera: Conjunctivae normal.   Cardiovascular:      Rate and Rhythm: Normal rate. Rhythm irregular.      Pulses: " "Normal pulses.      Heart sounds: S1 normal and S2 normal. No murmur heard.     No friction rub. No gallop.   Pulmonary:      Effort: Pulmonary effort is normal.      Breath sounds: Normal breath sounds.   Abdominal:      General: Bowel sounds are normal.      Palpations: Abdomen is soft.      Tenderness: There is no abdominal tenderness.   Musculoskeletal:      Cervical back: Neck supple.      Right lower leg: No edema.      Left lower leg: No edema.   Skin:     General: Skin is warm and dry.   Neurological:      General: No focal deficit present.      Mental Status: He is alert and oriented to person, place, and time.   Psychiatric:         Attention and Perception: Attention normal.         Mood and Affect: Mood normal.           Last Labs:  CBC -  Lab Results   Component Value Date    WBC 5.4 06/10/2024    HGB 15.5 06/10/2024    HCT 45.4 06/10/2024    MCV 87 06/10/2024     06/10/2024     Lab Results   Component Value Date    GLUCOSE 80 06/10/2024    CALCIUM 9.3 06/10/2024     06/10/2024    K 4.0 06/10/2024    CO2 28 06/10/2024     06/10/2024    BUN 13 06/10/2024    CREATININE 0.98 06/10/2024      CMP -  Lab Results   Component Value Date    CALCIUM 9.3 06/10/2024    PROT 7.1 06/27/2024    ALBUMIN 4.4 06/27/2024    AST 13 06/27/2024    ALT 16 06/27/2024    ALKPHOS 53 06/27/2024    BILITOT 1.2 06/27/2024       LIPID PANEL -   Lab Results   Component Value Date    CHOL 141 06/10/2024    TRIG 94 06/10/2024    HDL 35.2 06/10/2024    CHHDL 4.0 06/10/2024    LDLF 89 06/12/2023    VLDL 19 06/10/2024    NHDL 106 06/10/2024       No results found for: \"BNP\", \"HGBA1C\"    Last Cardiology Tests:  EKG:  EKG done in the office today and personally reviewed shows atrial fibrillation at 65 bpm, QT corrected interval 436 ms.  There are no significant ST or T wave abnormalities.  This will go to Dr. Narayanan for final review.    Stress Echo:11-4-24  Summary:   1. Anginal symptoms with exercise, resolved at " rest/recovery.   2. Adequate level of stress achieved.   3. No echocardiographic or electrocardiographic evidence for ischemia at maximal workload.       Lab review: I have personally reviewed the laboratory result(s)     Assessment/Plan:  Chest pain: Patient had anginal type chest discomfort while burning branches [place] his property this fall.  Since then he has been worried about overexerting.  He again had similar symptoms during his stress echo.  The stress echo did not show any evidence of ischemia but he was symptomatic.  Patient's coronary calcium score from September was 801.  I discussed a coronary CTA versus heart catheterization with the patient and his wife and he opted for a left heart catheterization due to his siblings who have recently needed stents and bypass surgery.  Patient will continue on atorvastatin, amlodipine, losartan/hydrochlorothiazide, and metoprolol.  He is not on aspirin due to the need for anticoagulation.    Atrial fibrillation: Patient has what appears to be permanent atrial fibrillation.  EKG today again shows A-fib with good rate control at 65 bpm.  Patient will continue on metoprolol succinate 50 mg daily.  Patient has an elevated [id number] score requiring anticoagulation.  He is not having any abnormal bleeding or bruising and should continue on Eliquis 5 mg twice daily.    Hypertension: Blood pressure today shows good control at 112/64.  Patient will continue on his current doses of amlodipine, losartan/hydrochlorothiazide, and metoprolol.  He should be on a heart healthy low-sodium diet.    Dyslipidemia: Lipid labs from June of this year show an LDL of 87, triglycerides 94.  At that time he was on pravastatin.  He is now on atorvastatin and will get fasting lipid labs done in January.  Ideally we would like to see his LDL a bit lower.    I discussed instructions for the heart catheterization.  Patient and his wife were also given an informational folder.  EKG was done today,  preprocedure labs were ordered.  Patient currently has a follow-up with me in January.  Patient instructed to call with any cardiovascular complaints. All questions were answered.       Dragon dictation was utilized to create this document. Quite often unanticipated grammatical, syntax,  and other interpretive errors are inadvertently transcribed by the computer software.  Please disregard these errors.  Please excuse any errors that have escaped final proofreading.          Patti Gimenez, KEYANNA-CNP

## 2024-11-20 NOTE — H&P (VIEW-ONLY)
Chief Complaint/Reason for Visit:   Patient is coming in today for a one month cardiovascular follow up.     History Of Present Illness:    Mr Templeton is coming in today as a 1 month cardiovascular follow-up.  We have followed this patient previously for atrial fibrillation, hypertension, dyslipidemia, abnormal coronary calcium score, and atypical chest pain.  He was scheduled for a stress echo which was done November 4, 2024 showing no EKG or echo evidence for ischemia at maximal workload.  He did have some anginal type symptoms with exercise.  Plan will be to discuss coronary CTA versus heart catheterization.  Patient also had reported a rash with itching that he attributed to Crestor.  He was advised to take a brief holiday from the medication to see if he had any improvement.    Patient presents today with his wife.  He has rash resolved after stopping rosuvastatin and is now taking atorvastatin without problems.  He has not been having any chest pain recently but tells me he has been trying to minimize his activity.  He did have significant chest pressure approximately 6 weeks ago with activity and again during his stress test.  He also has 2 younger siblings 1 of which had stents the other was sent for urgent bypass surgery this year.       Past Medical History:  He has a past medical history of Encounter for preprocedural cardiovascular examination (03/15/2017), Obstructive sleep apnea (adult) (pediatric), Personal history of other diseases of the circulatory system (03/07/2019), Personal history of other diseases of the digestive system, and Personal history of other diseases of the musculoskeletal system and connective tissue.    Past Surgical History:  He has a past surgical history that includes Total knee arthroplasty (03/10/2017); Other surgical history (03/10/2017); Other surgical history (03/10/2017); and Rotator cuff repair (03/15/2017).      Social History:  He reports that he has never smoked.  "He has never used smokeless tobacco. He reports that he does not currently use alcohol. He reports that he does not use drugs.    Family History:  Family History   Problem Relation Name Age of Onset    Atrial fibrillation Mother      Other (CVA) Mother      Coronary artery disease Father      Heart attack Father          Allergies:  Crestor [rosuvastatin]    Medications:  Current Outpatient Medications   Medication Instructions    amLODIPine (NORVASC) 5 mg, oral, Daily    apixaban (ELIQUIS) 5 mg, oral, 2 times daily    atorvastatin (LIPITOR) 40 mg, oral, Daily    losartan-hydrochlorothiazide (Hyzaar) 100-25 mg tablet 1 tablet, oral, Daily    metoprolol succinate XL (TOPROL-XL) 50 mg, oral, Daily    omeprazole (PRILOSEC) 20 mg, oral, Daily    sildenafil (VIAGRA) 100 mg, oral, As needed       Review of Systems:  Review of Systems   Constitutional: Negative. Negative for decreased appetite and malaise/fatigue.   HENT: Negative.     Eyes:  Negative for blurred vision and visual disturbance.   Cardiovascular:  Positive for chest pain. Negative for dyspnea on exertion, irregular heartbeat, leg swelling, orthopnea, palpitations and syncope.   Respiratory: Negative.  Negative for cough and shortness of breath.    Musculoskeletal:  Negative for arthritis and falls.   Gastrointestinal: Negative.    Neurological:  Positive for light-headedness. Negative for focal weakness.   Psychiatric/Behavioral:  Negative for depression and memory loss.         Vitals  Visit Vitals  /64 (BP Location: Left arm, Patient Position: Sitting, BP Cuff Size: Adult)   Pulse 82   Ht 1.702 m (5' 7\")   Wt 84.8 kg (187 lb)   SpO2 97%   BMI 29.29 kg/m²   Smoking Status Never   BSA 2 m²        Physical Exam:  Physical Exam  Constitutional:       Appearance: Normal appearance.   HENT:      Head: Normocephalic.   Eyes:      Conjunctiva/sclera: Conjunctivae normal.   Cardiovascular:      Rate and Rhythm: Normal rate. Rhythm irregular.      Pulses: " "Normal pulses.      Heart sounds: S1 normal and S2 normal. No murmur heard.     No friction rub. No gallop.   Pulmonary:      Effort: Pulmonary effort is normal.      Breath sounds: Normal breath sounds.   Abdominal:      General: Bowel sounds are normal.      Palpations: Abdomen is soft.      Tenderness: There is no abdominal tenderness.   Musculoskeletal:      Cervical back: Neck supple.      Right lower leg: No edema.      Left lower leg: No edema.   Skin:     General: Skin is warm and dry.   Neurological:      General: No focal deficit present.      Mental Status: He is alert and oriented to person, place, and time.   Psychiatric:         Attention and Perception: Attention normal.         Mood and Affect: Mood normal.           Last Labs:  CBC -  Lab Results   Component Value Date    WBC 5.4 06/10/2024    HGB 15.5 06/10/2024    HCT 45.4 06/10/2024    MCV 87 06/10/2024     06/10/2024     Lab Results   Component Value Date    GLUCOSE 80 06/10/2024    CALCIUM 9.3 06/10/2024     06/10/2024    K 4.0 06/10/2024    CO2 28 06/10/2024     06/10/2024    BUN 13 06/10/2024    CREATININE 0.98 06/10/2024      CMP -  Lab Results   Component Value Date    CALCIUM 9.3 06/10/2024    PROT 7.1 06/27/2024    ALBUMIN 4.4 06/27/2024    AST 13 06/27/2024    ALT 16 06/27/2024    ALKPHOS 53 06/27/2024    BILITOT 1.2 06/27/2024       LIPID PANEL -   Lab Results   Component Value Date    CHOL 141 06/10/2024    TRIG 94 06/10/2024    HDL 35.2 06/10/2024    CHHDL 4.0 06/10/2024    LDLF 89 06/12/2023    VLDL 19 06/10/2024    NHDL 106 06/10/2024       No results found for: \"BNP\", \"HGBA1C\"    Last Cardiology Tests:  EKG:  EKG done in the office today and personally reviewed shows atrial fibrillation at 65 bpm, QT corrected interval 436 ms.  There are no significant ST or T wave abnormalities.  This will go to Dr. Narayanan for final review.    Stress Echo:11-4-24  Summary:   1. Anginal symptoms with exercise, resolved at " rest/recovery.   2. Adequate level of stress achieved.   3. No echocardiographic or electrocardiographic evidence for ischemia at maximal workload.       Lab review: I have personally reviewed the laboratory result(s)     Assessment/Plan:  Chest pain: Patient had anginal type chest discomfort while burning branches [place] his property this fall.  Since then he has been worried about overexerting.  He again had similar symptoms during his stress echo.  The stress echo did not show any evidence of ischemia but he was symptomatic.  Patient's coronary calcium score from September was 801.  I discussed a coronary CTA versus heart catheterization with the patient and his wife and he opted for a left heart catheterization due to his siblings who have recently needed stents and bypass surgery.  Patient will continue on atorvastatin, amlodipine, losartan/hydrochlorothiazide, and metoprolol.  He is not on aspirin due to the need for anticoagulation.    Atrial fibrillation: Patient has what appears to be permanent atrial fibrillation.  EKG today again shows A-fib with good rate control at 65 bpm.  Patient will continue on metoprolol succinate 50 mg daily.  Patient has an elevated [id number] score requiring anticoagulation.  He is not having any abnormal bleeding or bruising and should continue on Eliquis 5 mg twice daily.    Hypertension: Blood pressure today shows good control at 112/64.  Patient will continue on his current doses of amlodipine, losartan/hydrochlorothiazide, and metoprolol.  He should be on a heart healthy low-sodium diet.    Dyslipidemia: Lipid labs from June of this year show an LDL of 87, triglycerides 94.  At that time he was on pravastatin.  He is now on atorvastatin and will get fasting lipid labs done in January.  Ideally we would like to see his LDL a bit lower.    I discussed instructions for the heart catheterization.  Patient and his wife were also given an informational folder.  EKG was done today,  preprocedure labs were ordered.  Patient currently has a follow-up with me in January.  Patient instructed to call with any cardiovascular complaints. All questions were answered.       Dragon dictation was utilized to create this document. Quite often unanticipated grammatical, syntax,  and other interpretive errors are inadvertently transcribed by the computer software.  Please disregard these errors.  Please excuse any errors that have escaped final proofreading.          Patti Gimenez, KEYANNA-CNP

## 2024-11-25 ENCOUNTER — OFFICE VISIT (OUTPATIENT)
Dept: CARDIOLOGY | Facility: HOSPITAL | Age: 74
End: 2024-11-25
Payer: MEDICARE

## 2024-11-25 VITALS
HEIGHT: 67 IN | OXYGEN SATURATION: 97 % | BODY MASS INDEX: 29.35 KG/M2 | DIASTOLIC BLOOD PRESSURE: 64 MMHG | HEART RATE: 82 BPM | WEIGHT: 187 LBS | SYSTOLIC BLOOD PRESSURE: 112 MMHG

## 2024-11-25 DIAGNOSIS — R07.89 OTHER CHEST PAIN: ICD-10-CM

## 2024-11-25 DIAGNOSIS — I20.89 STABLE ANGINA PECTORIS (CMS-HCC): ICD-10-CM

## 2024-11-25 DIAGNOSIS — I48.11 LONGSTANDING PERSISTENT ATRIAL FIBRILLATION (MULTI): ICD-10-CM

## 2024-11-25 DIAGNOSIS — I10 BENIGN ESSENTIAL HYPERTENSION: ICD-10-CM

## 2024-11-25 DIAGNOSIS — R94.39 ABNORMAL STRESS ECHOCARDIOGRAM: Primary | ICD-10-CM

## 2024-11-25 DIAGNOSIS — E78.5 DYSLIPIDEMIA, GOAL LDL BELOW 70: ICD-10-CM

## 2024-11-25 PROCEDURE — 3078F DIAST BP <80 MM HG: CPT | Performed by: NURSE PRACTITIONER

## 2024-11-25 PROCEDURE — 93010 ELECTROCARDIOGRAM REPORT: CPT | Performed by: INTERNAL MEDICINE

## 2024-11-25 PROCEDURE — 93005 ELECTROCARDIOGRAM TRACING: CPT | Performed by: NURSE PRACTITIONER

## 2024-11-25 PROCEDURE — 3074F SYST BP LT 130 MM HG: CPT | Performed by: NURSE PRACTITIONER

## 2024-11-25 PROCEDURE — 3008F BODY MASS INDEX DOCD: CPT | Performed by: NURSE PRACTITIONER

## 2024-11-25 PROCEDURE — 99214 OFFICE O/P EST MOD 30 MIN: CPT | Performed by: NURSE PRACTITIONER

## 2024-11-25 PROCEDURE — 1159F MED LIST DOCD IN RCRD: CPT | Performed by: NURSE PRACTITIONER

## 2024-11-25 PROCEDURE — 1036F TOBACCO NON-USER: CPT | Performed by: NURSE PRACTITIONER

## 2024-11-25 PROCEDURE — 1160F RVW MEDS BY RX/DR IN RCRD: CPT | Performed by: NURSE PRACTITIONER

## 2024-11-25 RX ORDER — ATORVASTATIN CALCIUM 40 MG/1
40 TABLET, FILM COATED ORAL DAILY
Qty: 90 TABLET | Refills: 3 | Status: SHIPPED | OUTPATIENT
Start: 2024-11-25 | End: 2025-11-25

## 2024-11-25 ASSESSMENT — ENCOUNTER SYMPTOMS
FALLS: 0
CONSTITUTIONAL NEGATIVE: 1
SYNCOPE: 0
COUGH: 0
LIGHT-HEADEDNESS: 1
PALPITATIONS: 0
DEPRESSION: 0
FOCAL WEAKNESS: 0
RESPIRATORY NEGATIVE: 1
ORTHOPNEA: 0
SHORTNESS OF BREATH: 0
GASTROINTESTINAL NEGATIVE: 1
IRREGULAR HEARTBEAT: 0
MEMORY LOSS: 0
DYSPNEA ON EXERTION: 0
BLURRED VISION: 0
DECREASED APPETITE: 0

## 2024-11-25 NOTE — PATIENT INSTRUCTIONS
Continue on current meds  Heart healthy, low sodium diet  Mediterranean diet is recommended  A C is being arranged  Follow up with me in January

## 2024-11-26 PROBLEM — R94.39 ABNORMAL STRESS ECHOCARDIOGRAM: Status: ACTIVE | Noted: 2024-11-25

## 2024-11-26 PROBLEM — I20.89 STABLE ANGINA PECTORIS (CMS-HCC): Status: ACTIVE | Noted: 2024-11-25

## 2024-11-29 ENCOUNTER — TELEPHONE (OUTPATIENT)
Dept: CARDIOLOGY | Facility: HOSPITAL | Age: 74
End: 2024-11-29
Payer: MEDICARE

## 2024-11-29 NOTE — TELEPHONE ENCOUNTER
11/29/24  1441  This is a plan of care note for a 74 year old male patient with a history of HTN, HLP, Angina who was referred for a heart catheterization due to an abnormal stress echocardiogram with chest pain during test; patient also has a significant family history of CAD which has required interventions.    Patient has no allergy to IV dye  Patient has a stable serum creatinine  Patient has recent EKG but needs labs done; ordered.  Patient does not take metformin  Patient does take OAC      Called to give pre procedure instructions for left heart cath to patient and spouse to include  Date of procedure, show time of procedure and procedure time  NPO after midnight x for metoprolol  To hold eliquis 2 days before procedure  To have a ride due to sedation  To shower the night before and wear loose comfortable clothes  To have labs done prior to procedure  To bring an overnight bag in case of overnight stay  To bring an ID card, insurance card and list of medications.    Patient/wife verbalized understanding of instructions.      ----- Message from [employer] SHARP sent at 11/27/2024 11:07 AM EST -----  Regarding: [facility]  Patient is scheduled for cath on: 12/12/2024  Arrival: 8:30 am  Please call pt with instructions. Thank you.

## 2024-12-02 ENCOUNTER — LAB (OUTPATIENT)
Dept: LAB | Facility: LAB | Age: 74
End: 2024-12-02
Payer: MEDICARE

## 2024-12-02 DIAGNOSIS — R94.39 ABNORMAL STRESS ECHOCARDIOGRAM: ICD-10-CM

## 2024-12-02 DIAGNOSIS — I10 BENIGN ESSENTIAL HYPERTENSION: ICD-10-CM

## 2024-12-02 DIAGNOSIS — I20.89 STABLE ANGINA PECTORIS (CMS-HCC): ICD-10-CM

## 2024-12-02 LAB
ANION GAP SERPL CALC-SCNC: 13 MMOL/L (ref 10–20)
BUN SERPL-MCNC: 13 MG/DL (ref 6–23)
CALCIUM SERPL-MCNC: 8.8 MG/DL (ref 8.6–10.3)
CHLORIDE SERPL-SCNC: 100 MMOL/L (ref 98–107)
CO2 SERPL-SCNC: 29 MMOL/L (ref 21–32)
CREAT SERPL-MCNC: 0.97 MG/DL (ref 0.5–1.3)
EGFRCR SERPLBLD CKD-EPI 2021: 82 ML/MIN/1.73M*2
ERYTHROCYTE [DISTWIDTH] IN BLOOD BY AUTOMATED COUNT: 13.5 % (ref 11.5–14.5)
GLUCOSE SERPL-MCNC: 83 MG/DL (ref 74–99)
HCT VFR BLD AUTO: 45.8 % (ref 41–52)
HGB BLD-MCNC: 15.7 G/DL (ref 13.5–17.5)
INR PPP: 1.4 (ref 0.9–1.1)
MCH RBC QN AUTO: 29.3 PG (ref 26–34)
MCHC RBC AUTO-ENTMCNC: 34.3 G/DL (ref 32–36)
MCV RBC AUTO: 86 FL (ref 80–100)
NRBC BLD-RTO: 0 /100 WBCS (ref 0–0)
PLATELET # BLD AUTO: 312 X10*3/UL (ref 150–450)
POTASSIUM SERPL-SCNC: 3.6 MMOL/L (ref 3.5–5.3)
PROTHROMBIN TIME: 15.8 SECONDS (ref 9.8–12.8)
RBC # BLD AUTO: 5.35 X10*6/UL (ref 4.5–5.9)
SODIUM SERPL-SCNC: 138 MMOL/L (ref 136–145)
WBC # BLD AUTO: 7.1 X10*3/UL (ref 4.4–11.3)

## 2024-12-02 PROCEDURE — 85610 PROTHROMBIN TIME: CPT

## 2024-12-02 PROCEDURE — 36415 COLL VENOUS BLD VENIPUNCTURE: CPT

## 2024-12-02 PROCEDURE — 85027 COMPLETE CBC AUTOMATED: CPT

## 2024-12-02 PROCEDURE — 80048 BASIC METABOLIC PNL TOTAL CA: CPT

## 2024-12-12 ENCOUNTER — PHARMACY VISIT (OUTPATIENT)
Dept: PHARMACY | Facility: CLINIC | Age: 74
End: 2024-12-12
Payer: COMMERCIAL

## 2024-12-12 ENCOUNTER — HOSPITAL ENCOUNTER (OUTPATIENT)
Facility: HOSPITAL | Age: 74
Setting detail: OUTPATIENT SURGERY
Discharge: HOME | End: 2024-12-12
Attending: INTERNAL MEDICINE | Admitting: INTERNAL MEDICINE
Payer: MEDICARE

## 2024-12-12 VITALS
OXYGEN SATURATION: 98 % | SYSTOLIC BLOOD PRESSURE: 136 MMHG | HEART RATE: 72 BPM | BODY MASS INDEX: 28.25 KG/M2 | WEIGHT: 180 LBS | TEMPERATURE: 98 F | RESPIRATION RATE: 18 BRPM | HEIGHT: 67 IN | DIASTOLIC BLOOD PRESSURE: 81 MMHG

## 2024-12-12 DIAGNOSIS — R07.9 CHEST PAIN, UNSPECIFIED: ICD-10-CM

## 2024-12-12 DIAGNOSIS — R94.39 ABNORMAL STRESS ECHOCARDIOGRAM: Primary | ICD-10-CM

## 2024-12-12 DIAGNOSIS — I25.84 CORONARY ATHEROSCLEROSIS DUE TO CALCIFIED CORONARY LESION (CODE): ICD-10-CM

## 2024-12-12 DIAGNOSIS — I20.89 STABLE ANGINA PECTORIS (CMS-HCC): ICD-10-CM

## 2024-12-12 DIAGNOSIS — I10 BENIGN ESSENTIAL HYPERTENSION: ICD-10-CM

## 2024-12-12 PROCEDURE — RXMED WILLOW AMBULATORY MEDICATION CHARGE

## 2024-12-12 PROCEDURE — 99152 MOD SED SAME PHYS/QHP 5/>YRS: CPT | Performed by: INTERNAL MEDICINE

## 2024-12-12 PROCEDURE — 7100000010 HC PHASE TWO TIME - EACH INCREMENTAL 1 MINUTE: Performed by: INTERNAL MEDICINE

## 2024-12-12 PROCEDURE — 93458 L HRT ARTERY/VENTRICLE ANGIO: CPT | Performed by: INTERNAL MEDICINE

## 2024-12-12 PROCEDURE — C1760 CLOSURE DEV, VASC: HCPCS | Performed by: INTERNAL MEDICINE

## 2024-12-12 PROCEDURE — 99153 MOD SED SAME PHYS/QHP EA: CPT | Performed by: INTERNAL MEDICINE

## 2024-12-12 PROCEDURE — 96373 THER/PROPH/DIAG INJ IA: CPT | Performed by: INTERNAL MEDICINE

## 2024-12-12 PROCEDURE — 2550000001 HC RX 255 CONTRASTS: Performed by: INTERNAL MEDICINE

## 2024-12-12 PROCEDURE — C1887 CATHETER, GUIDING: HCPCS | Performed by: INTERNAL MEDICINE

## 2024-12-12 PROCEDURE — 2500000004 HC RX 250 GENERAL PHARMACY W/ HCPCS (ALT 636 FOR OP/ED): Performed by: INTERNAL MEDICINE

## 2024-12-12 PROCEDURE — 7100000009 HC PHASE TWO TIME - INITIAL BASE CHARGE: Performed by: INTERNAL MEDICINE

## 2024-12-12 PROCEDURE — C1894 INTRO/SHEATH, NON-LASER: HCPCS | Performed by: INTERNAL MEDICINE

## 2024-12-12 PROCEDURE — 2720000007 HC OR 272 NO HCPCS: Performed by: INTERNAL MEDICINE

## 2024-12-12 RX ORDER — HEPARIN SODIUM 1000 [USP'U]/ML
INJECTION, SOLUTION INTRAVENOUS; SUBCUTANEOUS AS NEEDED
Status: DISCONTINUED | OUTPATIENT
Start: 2024-12-12 | End: 2024-12-12 | Stop reason: HOSPADM

## 2024-12-12 RX ORDER — METOPROLOL SUCCINATE 100 MG/1
100 TABLET, EXTENDED RELEASE ORAL DAILY
Qty: 30 TABLET | Refills: 0 | Status: SHIPPED | OUTPATIENT
Start: 2024-12-12 | End: 2025-01-11

## 2024-12-12 RX ORDER — WATER
240 LIQUID (ML) MISCELLANEOUS
Status: SHIPPED | OUTPATIENT
Start: 2024-12-12 | End: 2024-12-12

## 2024-12-12 RX ORDER — FENTANYL CITRATE 50 UG/ML
INJECTION, SOLUTION INTRAMUSCULAR; INTRAVENOUS AS NEEDED
Status: DISCONTINUED | OUTPATIENT
Start: 2024-12-12 | End: 2024-12-12 | Stop reason: HOSPADM

## 2024-12-12 RX ORDER — MIDAZOLAM HYDROCHLORIDE 1 MG/ML
INJECTION, SOLUTION INTRAMUSCULAR; INTRAVENOUS AS NEEDED
Status: DISCONTINUED | OUTPATIENT
Start: 2024-12-12 | End: 2024-12-12 | Stop reason: HOSPADM

## 2024-12-12 RX ORDER — LIDOCAINE HYDROCHLORIDE 20 MG/ML
INJECTION, SOLUTION INFILTRATION; PERINEURAL AS NEEDED
Status: DISCONTINUED | OUTPATIENT
Start: 2024-12-12 | End: 2024-12-12 | Stop reason: HOSPADM

## 2024-12-12 ASSESSMENT — PAIN SCALES - GENERAL
PAINLEVEL_OUTOF10: 0 - NO PAIN

## 2024-12-12 ASSESSMENT — COLUMBIA-SUICIDE SEVERITY RATING SCALE - C-SSRS
2. HAVE YOU ACTUALLY HAD ANY THOUGHTS OF KILLING YOURSELF?: NO
1. IN THE PAST MONTH, HAVE YOU WISHED YOU WERE DEAD OR WISHED YOU COULD GO TO SLEEP AND NOT WAKE UP?: NO
6. HAVE YOU EVER DONE ANYTHING, STARTED TO DO ANYTHING, OR PREPARED TO DO ANYTHING TO END YOUR LIFE?: NO

## 2024-12-12 ASSESSMENT — PAIN - FUNCTIONAL ASSESSMENT
PAIN_FUNCTIONAL_ASSESSMENT: 0-10

## 2024-12-12 NOTE — PRE-SEDATION DOCUMENTATION
"Cardiology Preprocedure Note    Gerhard Templeton   Indication for procedure: The primary encounter diagnosis was Abnormal stress echocardiogram. A diagnosis of Stable angina pectoris (CMS-HCC) was also pertinent to this visit. Patient here for chest pressure, had last episode few days ago after walking down and up the basement steps. Lasting minutes and dispated on its own.         /81   Pulse 72   Temp 36.7 °C (98 °F) (Temporal)   Resp 18   Ht 1.702 m (5' 7\")   Wt 81.6 kg (180 lb)   SpO2 98%   BMI 28.19 kg/m²    Relevant Labs:   Lab Results   Component Value Date    CREATININE 0.97 12/02/2024    EGFR 82 12/02/2024    INR 1.4 (H) 12/02/2024    PROTIME 15.8 (H) 12/02/2024       Planned Sedation/Anesthesia: Moderate    Airway assessment: normal    Directed physical examination: General: Alert and Oriented, No distress, cooperative. Lungs: Clear to auscultation bilaterally, no wheezes, rhonci, or rales. respirations unlabored Heart: irregular rhythm and rate, no murmur     Mallampati: II (hard and soft palate, upper portion of tonsils and uvula visible)    ASA Score: ASA 2 - Patient with mild systemic disease with no functional limitations    Benefits, risks and alternatives of procedure and planned sedation have been discussed with the patient and/or their representative. All questions answered and they agree to proceed.     Shantelle Trotter, APRN-CNP   "

## 2024-12-12 NOTE — POST-PROCEDURE NOTE
Physician Transition of Care Summary  Invasive Cardiovascular Lab    Procedure Date: 12/12/2024  Attending:    * Jodi Hoffman - Primary  Resident/Fellow/Other Assistant: Surgeons and Role:  * No surgeons found with a matching role *    Indications:   Pre-op Diagnosis      * Abnormal stress echocardiogram [R94.39]     * Stable angina pectoris (CMS-HCC) [I20.89]    Post-procedure diagnosis:   Post-op Diagnosis     * Abnormal stress echocardiogram [R94.39]     * Stable angina pectoris (CMS-HCC) [I20.89]    Procedure(s):   Left Heart Cath  42967 - UT L HRT CATH W/NJX L VENTRICULOGRAPHY IMG S&I        Procedure Findings:   70% stensis LAD Diagonal 3  Myocardial bridging of the LAD   Please see full cath report for details     Description of the Procedure:   LHC   RRA>TR band     Complications:   None     Stents/Implants:   NA     Anticoagulation/Antiplatelet Plan:   Continue on aspirin 81 mg daily     Estimated Blood Loss:   5 mL    Anesthesia: Moderate Sedation Anesthesia Staff: No anesthesia staff entered.    Any Specimen(s) Removed:   NA    Disposition:   Recovery and home today     Recs:   Will increase Toprol XL to 100 mg from 50 mg to help reduce symptoms from myocardial bridging       Electronically signed by: NOE Quinones, 12/12/2024 1:03 PM

## 2024-12-12 NOTE — DISCHARGE INSTRUCTIONS
If you have any questions, please call the Cath Lab Nurse Practitioner Adrianavirginie Trotter at [phone] and leave a message. She will return your call the same day if calling before 3 PM, M-F. If you call on the weekend you can expect a call back on Monday morning. You may also call the Cath Lab at [phone] M-F, 7-3:30 with any questions. Weekends and after hours please call your cardiologist office number to reach a physician on call. The heart group office number is [phone]           CARDIAC CATHETERIZATION DISCHARGE INSTRUCTIONS     FOR SUDDEN AND SEVERE CHEST PAIN, SHORTNESS OF BREATH, EXCESSIVE BLEEDING, SIGNS OF STROKE, OR CHANGES IN MENTAL STATUS YOU SHOULD CALL 911 IMMEDIATELY.     If your provider has prescribed aspirin and/or clopidogrel (Plavix), or prasugrel (Effient), or ticagrelor (Brilinta), DO NOT STOP THESE MEDICATIONS for any reason without talking to your cardiologist first. If any of these were prescribed, you must take them every day without missing a single dose. If you are getting low on these medications, contact your provider immediately for a refill.     FOR NEXT 24 HOURS  - Upon discharge, you should return home and rest for the remainder of the day and evening. You do not have to stay on bed rest but should not be very active.  It is recommended a responsible adult be with you for the first 24 hours after the procedure.    - No driving for 24 hours after procedure. Please arrange for someone to drive you home from the hospital today.     - Do not drive, operate machinery, or use power tools for 24 hours after your procedure.     - Do not make any legal decisions for 24 hours after your procedure.     - Do not drink alcoholic beverages for 24 hours after your procedure.    WOUND CARE   *FOR FEMORAL (LEG) ACCESS*  ·      Avoid heavy lifting (over 10 pounds) for 7 days, squatting or excessive bending for 2 days, and strenuous exercise for 7 days.  ·      No submerged bathing, swimming, or  hot tubs for the next 7 days, or until fully healed.  ·      Avoid sexual activity for 3-4 days until any groin discomfort has ceased.     *FOR RADIAL (WRIST) ACCESS*  ·      No lifting more than 5 pounds or excessive use of the wrist for 24 hours - for example, treat your wrist as if it is sprained.  ·      Do not engage in vigorous activities (tennis, golf, bowling, weights) for at least 48 hours after the procedure.  ·      Do not submerge the wrist for 7 days after the procedure.  ·      You should expect mild tingling in your hand and tenderness at the puncture site for up to 3 days.    - The transparent dressing should be removed from the site 24 hours after the procedure.  Wash the site gently with soap and water. Rinse well and pat dry. Keep the area clean and dry. You may apply a Band-Aid to the site. Avoid lotions, ointments, or powders until fully healed.     - You may shower the day after your procedure.      - It is normal to notice a small bruise around the puncture site and/or a small grape sized or smaller lump. Any large bruising or large lump warrants a call to the office.     - If bleeding should occur, lay down and apply pressure to the affected area for 10 minutes.  If the bleeding stops notify your physician.  If there is a large amount of bleeding or spurting of blood CALL 911 immediately.  DO NOT drive yourself to the hospital.    - You may experience some tenderness, bruising or minimal inflammation.  If you have any concerns, you may contact the Cath Lab or if any of these symptoms become excessive, contact your cardiologist or go to the emergency room.     OTHER INSTRUCTIONS  - You may take acetaminophen (Tylenol) as directed for discomfort.  If pain is not relieved with acetaminophen (Tylenol), contact your doctor.    - If you notice or experience any of the following, you should notify your doctor or seek medical attention  Chest pain or discomfort  Change in mental status or weakness in  extremities.  Dizziness, light headedness, or feeling faint.  Change in the site where the procedure was performed, such as bleeding or an increased area of bruising or swelling.  Tingling, numbness, pain, or coolness in the leg/arm beyond the site where the procedure was performed.  Signs of infection (i.e. shaking chills, temperature > 100 degrees Fahrenheit, warmth, redness) in the leg/arm area where the procedure was performed.  Changes in urination   Bloody or black stools  Vomiting blood  Severe nose bleeds  Any excessive bleeding    - If you DO NOT have an appointment with your cardiologist within 2-4 weeks following your procedure, please contact their office.      Important ways to reduce your risk of coronary artery disease by healthy diet, maintaining a healthy weight, exercising regularly and stop using tobacco products.     Mediterranean Diet    About this topic  This is a heart healthy diet. It is based on widely used foods and cooking styles from many countries around the [place]. The main pattern for the diet is more plant foods and monounsaturated fats, or good fats, like olive oil. Protein in this diet comes from seafood, legumes, nuts, seeds, and poultry and eggs in lowered amounts. You will also eat more whole grains, vegetables, and fruits and moderate amounts of alcohol are also included. This diet has less red meats, dairy products, and processed foods.    What will the results be?  Your diet will have less saturated fat, cholesterol, calories, sodium, and added sugars. Your diet will be higher in fiber. This will help to keep your blood sugar steady. This diet lowers the chance of heart disease and other health problems.  What lifestyle changes are needed?  If you do not often eat this way, you will need to change your eating habits. Be sure to get regular exercise. It is believed to help the health benefits of this diet.  What changes to diet are needed?  You may need to limit the  "amount of red meat and processed foods in your diet. Ask your dietitian for help planning meals that are right for you.  What foods are good to eat?  Plenty of fish and other seafood  Fresh, frozen, or canned fruits and vegetables  Nuts and nut butters and dried beans, lentils, or peas  Foods high in fiber like whole grains and whole grain products  Olive oil (good fat), peanut or canola oil, margarine, or spreads that list vegetable oil as the first ingredient and do not contain trans fat or partially hydrogenated oil  Small amounts of poultry and eggs  What foods should be limited or avoided?  Red meats  Sweets, desserts, and processed foods  Butter, oils, and fats that contain trans fats or are hydrogenated or partially hydrogenated  Gravies and sauces  What problems could happen?  Your weight may rise because your diet will be higher in fat from olive oil and nuts.  You may have lower iron levels. Be sure to eat foods rich in iron. Also, eat foods rich in vitamin C. This will help your body take in iron.  You may have lower calcium levels because you are eating less dairy products. Ask your doctor if you need to take a calcium supplement.  Wine is often thought of as part of a Mediterranean diet. It is not needed and you may choose not to include it. Avoid wine if you are prone to alcohol abuse or are pregnant. Also, avoid it if you are at risk for breast cancer, have liver problems, or have other illnesses that make it important for you to not have alcohol.  When do I need to call the doctor?  If you have any concerns about your diet     Health risks of obesity    The Basics  Written by the doctors and editors at [facility]  What does it mean to have obesity? -- Doctors use a calculation called \"body mass index,\" or \"BMI,\" to decide whether a person is underweight, at a healthy weight, overweight, or has obesity.  Your BMI will tell you which category you are in based on your weight and height (figure 1):  ?If " "your BMI is between 25 and 29.9, you are overweight.  ?If your BMI is 30 or greater, you have obesity.  Obesity is a problem, because it increases the risks of many different health problems. It can also make it hard for you to move, breathe, and do other things that people who are at a healthy weight can do easily.  What are the health risks of obesity? -- Having obesity increases a person's risk of developing many health problems. Here are just a few examples:  ?Diabetes  ?High blood pressure  ?High cholesterol  ?Heart disease (including heart attacks)  ?Stroke  ?Sleep apnea (a disorder in which you stop breathing for short periods while asleep)  ?Asthma  ?Cancer  Does having obesity shorten a person's life? -- Yes. Studies show that people with obesity die younger than people who are a healthy weight. They also show that the risk of death goes up the heavier a person is. The degree of increased risk depends on how long the person has had obesity, and on what other medical problems they have.  People with \"central obesity\" might also be at risk of dying younger. Central obesity means carrying extra weight in the belly area, even if the BMI is normal.  Should I see an expert? -- Yes. If you are overweight or have obesity, you can talk to your doctor or nurse. They might have suggestions for healthy ways to lose weight. It can also help to work with a dietitian (food and nutrition expert). A dietitian can help you choose healthy foods and plan meals.  Are there medical treatments that can help me lose weight? -- Yes. There are medicines and surgery to help with weight loss. But those treatments are only for people who have not been able to lose weight through lifestyle changes such as diet and exercise. Also, weight loss treatments do not take the place of diet and exercise. People who have those treatments must also change how they eat and how active they are.  What can I do to prevent the problems caused by " obesity? -- The best thing that you can do is lose weight. But even if weight loss is not possible, you can improve your health and lower your risk if you:  ?Become more active - Many types of physical activity can help, including walking. You can start with a few minutes a day and add more as you get stronger and build up your endurance. Anything that gets your body moving is good for you.  ?Improve your diet - It is healthy to have regular meal times, eat smaller portions, and not skip meals. Limit sweets, and avoid processed foods. Try to eat more vegetables and fruits instead.  ?Quit smoking (if you smoke) - Some people start eating more after they stop smoking, so try to make healthy food choices. Even if it increases your appetite, quitting smoking is still one of the best things that you can do to improve your health.  ?Limit alcohol - For females, drink no more than 1 drink a day. For males, drink no more than 2 drinks a day.  What causes obesity? -- The thing that increases a person's risk the most is having an unhealthy lifestyle. Most people develop obesity because they eat too much, eat unhealthy foods, and move too little. That's especially true of people who watch too much TV. But there are also other things that seem to increase the risk of obesity that many people do not know about. Here are some things that might affect a person's chance of developing obesity:  ?Mother's habits during and after pregnancy - People who eat a lot of calories, have diabetes, or smoke during pregnancy have a higher chance of having babies who have obesity as adults. Also, babies who drink formula might be more likely than  babies to develop obesity later in life.  ?Habits and weight gain during childhood - Children or teens who are overweight or have obesity are more likely to become have obesity as an adult.  ?Sleeping too little - People who do not get enough sleep are more likely to develop obesity than  "people who sleep enough.  ?Taking certain medicines - Long-term use of certain medicines, such as some medicines to treat depression, can cause weight gain. If you are concerned that 1 of your medicines might be making you gain weight, talk to your doctor or nurse. They might be able to switch you to a different medicine instead.  ?Certain hormonal conditions - Some hormonal problems can increase the risk of developing obesity. For example, a condition called \"polycystic ovary syndrome\" can cause weight gain, along with other symptoms like irregular periods.  What if I want to get pregnant? -- If you are overweight or have obesity, it might be harder to get pregnant. For males, obesity can also cause sex problems, like having trouble getting or keeping an erection. This is more likely if you also have high blood pressure or diabetes.  What if my child has obesity? -- In children, obesity has many of the same risks as it does in adults. For example, it can increase the risk of diabetes, high blood pressure, asthma, and sleep apnea. It can also cause added problems related to childhood. For example, obesity can make children grow faster than normal and cause girls to go through puberty earlier than usual.  All topics are updated as new evidence becomes available and our peer review process is complete.  This topic retrieved from [employer] on: Jan 11, 2024.  Topic 64452 Version 17.0  Release: 31.6.4 - C32.10  © 2024 [employer], [employer]. and/or its affiliates. All rights reserved.  figure 1: Your body mass index (BMI)        If you use tobacco products please review   Quitting smoking    The Basics  Written by the doctors and editors at [employer]  What are the benefits of quitting smoking? -- Quitting smoking can dramatically improve your health and help you live longer. It lowers your risk of heart disease, lung disease, kidney failure, cancer, infection, stomach problems, and diabetes.  Quitting smoking can also lower your " "chances of getting osteoporosis, a condition that makes your bones weak.  Quitting is not easy for most people, and it might take several tries to completely quit. But help and support are available. Quitting smoking will improve your health no matter how old you are, even if you have smoked for a long time.  What should I do if I want to quit smoking? -- It's a good idea to start by talking with your doctor or nurse. It is possible to quit on your own, without help. But getting help greatly increases your chances of quitting successfully.  When you are ready to quit, you will make a plan to:  ?Set a quit date.  ?Tell your family and friends that you plan to quit.  ?Plan ahead for the challenges you will face, such as cigarette cravings.  ?Remove cigarettes from your home, car, and work.  How can my doctor or nurse help? -- Your doctor or nurse can give you advice on the best way to quit. They can also give you medicines to:  ?Reduce your craving for cigarettes  ?Reduce your \"withdrawal\" symptoms (symptoms that happen when you stop smoking)  Your doctor or nurse can also help you find a counselor to talk to. For most people who are trying to quit smoking, it works best to use both medicines and counseling.  You can also get help from a free phone line (3-778-QUITNOW, or 1-461.983.8678) or go online to www.smokefree.gov.  What are the symptoms of withdrawal? -- When you stop smoking, you might have symptoms such as:  ?Trouble sleeping  ?Feeling irritable, anxious, or restless  ?Getting frustrated or angry  ?Having trouble thinking clearly  These symptoms can be hard to deal with, which is why it can be so hard to quit. But medicines can help.  Some people who stop smoking become temporarily depressed. Some people need treatment for depression, such as counseling or medicines or both. People with depression might:  ?No longer enjoy or care about doing the things they used to like to do  ?Feel sad, down, hopeless, " nervous, or cranky most of the day, almost every day  ?Lose or gain weight  ?Sleep too much or too little  ?Feel tired or like they have no energy  ?Feel guilty or like they are worth nothing  ?Forget things or feel confused  ?Move and speak more slowly than usual  ?Act restless or have trouble staying still  ?Think about death or suicide  If you think you might be depressed, tell your doctor or nurse right away. They can talk to you about your symptoms and recommend treatment if needed.  Get help right away if you are thinking of hurting or killing yourself! -- Sometimes, people with depression think of hurting or killing themselves. If you ever feel like you might hurt yourself, help is available:  ?In the , contact the [employer] Suicide [facility] Crisis Lifeline:  To speak to someone, call or text [employer].  To talk to someone online, go to www.[employer]/chat.  ?Call your doctor or nurse and tell them that it is an emergency.  ?Call for an ambulance (in the [place] and [place], call 9-1-1).  ?Go to the emergency department at your local hospital.  If you think your partner might have depression, or if you are worried that they might hurt themselves, get them help right away.  How does counseling work? -- A counselor can help you figure out:  ?What triggers you to want to smoke, and how to handle these situations  ?How to resist cravings  ?What you can do differently if you have tried to quit before  You can meet with a counselor in 1-on-1 sessions or as part of a group. There are other ways to get counseling, too, such as over the phone, through text messaging, or online.  How do medicines help you stop smoking? -- Different medicines work in different ways:  ?Nicotine replacement therapy - Nicotine is the main drug in cigarettes, and the reason they are addictive. These medicines reduce your body's craving for nicotine. They also help with withdrawal symptoms.  There are different forms of nicotine replacement, including skin  "patches, lozenges, gum, nasal sprays, and inhalers. Most can be bought without a prescription. Also, health insurance might cover some or all of the cost.  It often helps to use 2 forms of nicotine replacement. For example, you might wear a patch all the time, plus use gum or lozenges when you get a craving to smoke.  ?Varenicline - Varenicline (brand names: Chantix, Champix) is a prescription medicine that reduces withdrawal symptoms and cigarette cravings. Varenicline can increase the effects of alcohol in some people. It's a good idea to limit drinking while you're taking it, at least until you know how it affects you.  Even if you are not yet ready to commit to a quit date, varenicline can help reduce cravings. This can make it easier to quit when you are ready.  ?Bupropion - Bupropion (sample brand names: Wellbutrin, Zyban) is a prescription medicine that reduces your desire to smoke. It is also available in a generic version, which is cheaper than the brand name medicines. Doctors do not usually prescribe bupropion for people with seizures or who have had seizures in the past.  It might also be helpful to combine nicotine replacement with bupropion or varenicline. In some cases, a person might even take both bupropion and varenicline. Your doctor or nurse can help you figure out the best combination for you.  What about e-cigarettes? -- Sometimes people wonder if using electronic cigarettes, or \"e-cigarettes,\" can help them quit smoking. Using e-cigarettes is also called \"vaping.\" Doctors do not recommend e-cigarettes in place of using of medicines and counseling. That's because e-cigarettes still contain nicotine as well as other substances that might be harmful. It's also not clear how they can affect a person's health in the long term.  What if I am pregnant and I smoke? -- If you are pregnant, it's really important for the health of your baby that you quit. Ask your doctor what options you have, and what " is safest for your baby.  What if I have already smoked for a long time? -- The longer you have smoked, the higher your chances are of having health problems. But it is never too late to quit smoking. It helps your health even if you are older or have smoked for many years. The best thing you can do to lower your chance of having a health problem caused by smoking is to quit.  Will I gain weight if I quit? -- You might gain a few pounds. This can be frustrating for some people. But it's important to remember that you are improving your health by quitting smoking. You can help prevent gaining a lot of weight by staying active and eating a healthy diet.  What if I am not able to quit? -- If you don't quit on your first try, or if you quit but then start smoking again, don't give up hope. Lots of people have to try more than once before they are able to completely quit.  It might help to try to understand why quitting did not work. There might be something you can do differently when you try again. It can help to figure out which situations make you want to smoke, so you can avoid them.  What else can I do to improve my chances of quitting? -- You can:  ?Get regular exercise. Any type of physical activity, even gentle forms of movement, is good for your health. Physical activity can also help reduce stress.  ?Stay away from people who smoke and places that make you want to smoke. If people close to you smoke, ask them to quit with you or avoid smoking around you.  ?Carry gum, hard candy, or something to put in your mouth. If you get a craving for a cigarette, try 1 of these instead.  ?Don't give up, even if you start smoking again. It takes most people a few tries before they succeed.  All topics are updated as new evidence becomes available and our peer review process is complete.

## 2024-12-12 NOTE — Clinical Note
Closure device placed in the right radial artery. Site closed by radial compression system. 14cc of air

## 2025-01-02 ENCOUNTER — APPOINTMENT (OUTPATIENT)
Dept: PRIMARY CARE | Facility: CLINIC | Age: 75
End: 2025-01-02
Payer: MEDICARE

## 2025-01-02 VITALS
WEIGHT: 180 LBS | HEART RATE: 68 BPM | DIASTOLIC BLOOD PRESSURE: 70 MMHG | BODY MASS INDEX: 28.25 KG/M2 | HEIGHT: 67 IN | SYSTOLIC BLOOD PRESSURE: 118 MMHG

## 2025-01-02 DIAGNOSIS — I48.19 PERSISTENT ATRIAL FIBRILLATION (MULTI): ICD-10-CM

## 2025-01-02 DIAGNOSIS — G47.33 OSA ON CPAP: ICD-10-CM

## 2025-01-02 DIAGNOSIS — K21.9 GASTROESOPHAGEAL REFLUX DISEASE WITHOUT ESOPHAGITIS: ICD-10-CM

## 2025-01-02 DIAGNOSIS — Z00.00 ROUTINE GENERAL MEDICAL EXAMINATION AT HEALTH CARE FACILITY: ICD-10-CM

## 2025-01-02 DIAGNOSIS — E78.5 DYSLIPIDEMIA, GOAL LDL BELOW 70: ICD-10-CM

## 2025-01-02 DIAGNOSIS — I10 BENIGN ESSENTIAL HYPERTENSION: ICD-10-CM

## 2025-01-02 DIAGNOSIS — I20.89 STABLE ANGINA PECTORIS (CMS-HCC): ICD-10-CM

## 2025-01-02 DIAGNOSIS — Z00.00 MEDICARE ANNUAL WELLNESS VISIT, SUBSEQUENT: Primary | ICD-10-CM

## 2025-01-02 DIAGNOSIS — I48.0 PAROXYSMAL ATRIAL FIBRILLATION (MULTI): ICD-10-CM

## 2025-01-02 PROBLEM — R94.39 ABNORMAL STRESS ECHOCARDIOGRAM: Status: RESOLVED | Noted: 2024-11-25 | Resolved: 2025-01-02

## 2025-01-02 PROBLEM — R07.89 ATYPICAL CHEST PAIN: Status: RESOLVED | Noted: 2024-10-15 | Resolved: 2025-01-02

## 2025-01-02 PROCEDURE — G0009 ADMIN PNEUMOCOCCAL VACCINE: HCPCS | Performed by: INTERNAL MEDICINE

## 2025-01-02 PROCEDURE — 1159F MED LIST DOCD IN RCRD: CPT | Performed by: INTERNAL MEDICINE

## 2025-01-02 PROCEDURE — 90677 PCV20 VACCINE IM: CPT | Performed by: INTERNAL MEDICINE

## 2025-01-02 PROCEDURE — 99214 OFFICE O/P EST MOD 30 MIN: CPT | Performed by: INTERNAL MEDICINE

## 2025-01-02 PROCEDURE — 1170F FXNL STATUS ASSESSED: CPT | Performed by: INTERNAL MEDICINE

## 2025-01-02 PROCEDURE — 1160F RVW MEDS BY RX/DR IN RCRD: CPT | Performed by: INTERNAL MEDICINE

## 2025-01-02 PROCEDURE — 3078F DIAST BP <80 MM HG: CPT | Performed by: INTERNAL MEDICINE

## 2025-01-02 PROCEDURE — 3008F BODY MASS INDEX DOCD: CPT | Performed by: INTERNAL MEDICINE

## 2025-01-02 PROCEDURE — 3074F SYST BP LT 130 MM HG: CPT | Performed by: INTERNAL MEDICINE

## 2025-01-02 PROCEDURE — 1036F TOBACCO NON-USER: CPT | Performed by: INTERNAL MEDICINE

## 2025-01-02 ASSESSMENT — ANXIETY QUESTIONNAIRES
7. FEELING AFRAID AS IF SOMETHING AWFUL MIGHT HAPPEN: NOT AT ALL
GAD7 TOTAL SCORE: 0
6. BECOMING EASILY ANNOYED OR IRRITABLE: NOT AT ALL
IF YOU CHECKED OFF ANY PROBLEMS ON THIS QUESTIONNAIRE, HOW DIFFICULT HAVE THESE PROBLEMS MADE IT FOR YOU TO DO YOUR WORK, TAKE CARE OF THINGS AT HOME, OR GET ALONG WITH OTHER PEOPLE: NOT DIFFICULT AT ALL
3. WORRYING TOO MUCH ABOUT DIFFERENT THINGS: NOT AT ALL
5. BEING SO RESTLESS THAT IT IS HARD TO SIT STILL: NOT AT ALL
4. TROUBLE RELAXING: NOT AT ALL
2. NOT BEING ABLE TO STOP OR CONTROL WORRYING: NOT AT ALL
1. FEELING NERVOUS, ANXIOUS, OR ON EDGE: NOT AT ALL

## 2025-01-02 ASSESSMENT — ACTIVITIES OF DAILY LIVING (ADL)
GROCERY_SHOPPING: INDEPENDENT
TAKING_MEDICATION: INDEPENDENT
DRESSING: INDEPENDENT
BATHING: INDEPENDENT
DOING_HOUSEWORK: INDEPENDENT
MANAGING_FINANCES: INDEPENDENT

## 2025-01-02 ASSESSMENT — ENCOUNTER SYMPTOMS
OCCASIONAL FEELINGS OF UNSTEADINESS: 0
LOSS OF SENSATION IN FEET: 0
CHEST TIGHTNESS: 1
DEPRESSION: 0

## 2025-01-02 NOTE — ASSESSMENT & PLAN NOTE
Compliant with regular use of CPAP nightly basis in the setting of persistent atrial fibrillation

## 2025-01-02 NOTE — ASSESSMENT & PLAN NOTE
Blood pressure well-controlled on losartan hydrochlorothiazide 100/25 1 tablet daily with metoprolol succinate  mg daily continue amlodipine 5 mg daily  Orders:    Follow Up In [facility] - PCP - Established

## 2025-01-02 NOTE — PROGRESS NOTES
"Subjective   Reason for Visit: Gerhard Templeton is an 74 y.o. male here for a Medicare Wellness visit.     Past Medical, Surgical, and Family History reviewed and updated in chart.         HPI    Patient Care Team:  Kavon Shepherd DO as PCP - General  Kavon Shepherd DO as PCP - [employer] PCP     Review of Systems   Respiratory:  Positive for chest tightness.        Objective   Vitals:  /70   Pulse 68   Ht 1.702 m (5' 7\")   Wt 81.6 kg (180 lb)   BMI 28.19 kg/m²       Physical Exam  Vitals and nursing note reviewed.   Constitutional:       General: He is not in acute distress.     Appearance: Normal appearance. He is well-developed. He is not toxic-appearing.   HENT:      Head: Normocephalic and atraumatic.      Right Ear: Tympanic membrane and external ear normal.      Left Ear: Tympanic membrane and external ear normal.      Nose: Nose normal.      Mouth/Throat:      Mouth: Mucous membranes are moist.      Pharynx: Oropharynx is clear. No oropharyngeal exudate or posterior oropharyngeal erythema.      Tonsils: No tonsillar exudate. 2+ on the right. 2+ on the left.   Eyes:      Extraocular Movements: Extraocular movements intact.      Conjunctiva/sclera: Conjunctivae normal.   Cardiovascular:      Rate and Rhythm: Normal rate and regular rhythm.      Pulses: Normal pulses.      Heart sounds: Normal heart sounds. No murmur heard.  Pulmonary:      Effort: Pulmonary effort is normal.      Breath sounds: Normal breath sounds.   Abdominal:      General: Abdomen is flat. Bowel sounds are normal.      Palpations: Abdomen is soft.   Musculoskeletal:      Cervical back: Neck supple.   Feet:      Right foot:      Skin integrity: Skin integrity normal. No ulcer, blister, skin breakdown, erythema, warmth or callus.      Toenail Condition: Right toenails are normal.      Left foot:      Skin integrity: Skin integrity normal. No ulcer, blister, skin breakdown, erythema, warmth or callus.      " Toenail Condition: Left toenails are normal.   Lymphadenopathy:      Cervical: No cervical adenopathy.   Skin:     General: Skin is warm and dry.      Capillary Refill: Capillary refill takes more than 3 seconds.      Findings: No rash.   Neurological:      Mental Status: He is alert. Mental status is at baseline.      Sensory: Sensation is intact.   Psychiatric:         Mood and Affect: Mood normal.         Behavior: Behavior normal.         Thought Content: Thought content normal.         Judgment: Judgment normal.     Lifestyle Recommendations  I recommend a whole-food plant-based diet, an eating pattern that encourages the consumption of unrefined plant foods (such as fruits, vegetables, tubers, whole grains, legumes, nuts and seeds) and discourages meats, dairy products, eggs and processed foods.     The AHA/ACC recommends that the patient consume a dietary pattern that emphasizes intake of vegetables, fruits, and whole grains; includes low-fat dairy products, poultry, fish, legumes, non-tropical vegetable oils, and nuts; and limits intake of sodium, sweets, sugar-sweetened beverages, and red meats.  Adapt this dietary pattern to appropriate calorie requirements (a 500-750 kcal/day deficit to loose weight), personal and cultural food preferences, and nutrition therapy for other medical conditions (including diabetes).  Achieve this pattern by following plans such as the [place] Mediterranean, DASH dietary pattern, or AHA diet.     Engage in 2 hours and 30 minutes per week of moderate-intensity physical activity, or 1 hour and 15 minutes (75 minutes) per week of vigorous-intensity aerobic physical activity, or an equivalent combination of moderate and vigorous-intensity aerobic physical activity. Aerobic activity should be performed in episodes of at least 10 minutes preferably spread throughout the week.     Adhering to a heart healthy diet, regular exercise habits, avoidance of tobacco products, and maintenance  of a healthy weight are crucial components of their heart disease risk reduction.    Assessment & Plan  Paroxysmal atrial fibrillation (Multi)  Chronic rate controlled stable on metoprolol succinate  mg daily continue apixaban 5 mg twice a day  Orders:    Follow Up In [facility] - PCP - Established    Benign essential hypertension  Blood pressure well-controlled on losartan hydrochlorothiazide 100/25 1 tablet daily with metoprolol succinate  mg daily continue amlodipine 5 mg daily  Orders:    Follow Up In [facility] - PCP - Established    Stable angina pectoris (CMS-[facility])  Recent workup as a result of strong family history of cardiovascular disease in both brother and sister revealed high calcium score however cardiac catheterization completed revealed minimal disease less than 30% LAD continue strong secondary prevention with optimization of blood pressure and dyslipidemia as well as baby aspirin patient continues to have mild symptoms in the form of chronic stable angina is to be evaluated for ablation therapy for persistent atrial fibrillation continues on 2 antianginal medications with metoprolol and amlodipine could consider addition of isosorbide patient feels mild at this time does not wish to use medication in the setting of erectile dysfunction and use of sildenafil continue to follow closely stable  Orders:    Follow Up In [facility] - PCP - Established; Future    ROSA on CPAP  Compliant with regular use of CPAP nightly basis in the setting of persistent atrial fibrillation       Medicare annual wellness visit, subsequent  Up-to-date with vaccinations and screenings updated Prevnar 20 vaccination at this time discussed advanced medical directives       Gastroesophageal reflux disease without esophagitis  Symptoms controlled without red flag symptoms on omeprazole 20 mg daily continue       Dyslipidemia, goal LDL below 70  In light of established cardiovascular  disease by cardiac catheterization LDL goal less than 70 continue atorvastatin 40 mg daily.  Patient difficulty in tolerating rosuvastatin in the past if LDL goal not achieved consider addition of ezetimibe and/or bempedoic acid will follow-up with cardiology to discuss this further       Routine general medical examination at health care facility  Updated Prevnar 20 vaccination  Orders:    1 Year Follow Up In [facility] - PCP - Wellness Exam; Future    Persistent atrial fibrillation (Multi)  As noted above rate controlled propensity for mild bradycardia no symptoms of tacky bradycardia symptoms at this time has follow-up with EP specialist to evaluate for possible ablative therapy in the future mildly symptomatic and stable at this time continue apixaban 5 mg twice a day              Advance Directives Discussion  16 - 20 minutes were spent discussing Advanced Care Planning (including a Living Will, Medical Power Of , as well as specific end of life choices and/or directives). The details of that discussion were documented in Advanced Directives Discussion section of the medical record.     Cardiac Risk Assessment  15 - 20 minutes were spent discussing Cardiovascular risk and, if needed, lifestyle modifications were recommended, including nutritional choices, exercise, and elimination of habits contributing to risk.  ASCVD risk score 23.5%  Aspirin use/disuse was discussed following the guidelines below:  low dose ASA ( mg) should be considered:    If prior Heart Attack/Stroke/Peripheral vascular disease:  Generally recommend daily low dose aspirin unless extremely high bleeding risk (e.g., gastrointestinal).    If no prior Heart Attack/Stroke/Peripheral vascular disease:              Age over 70: Do not use Aspirin for prevention    Age less than 70 and 10-year cardiovascular disease risk is >20%: use low dose Aspirin for prevention.                 Depression Screening  5 - 10 minutes  were spent screening for depression.

## 2025-01-02 NOTE — PROGRESS NOTES
"Subjective   Reason for Visit: Gerhard Templeton is an 74 y.o. male here for a Medicare Wellness visit.          Reviewed all medications by prescribing practitioner or clinical pharmacist (such as prescriptions, OTCs, herbal therapies and supplements) and documented in the medical record.    HPI    Patient Care Team:  Kavon Shepherd DO as PCP - General  Kavon Shepherd DO as PCP - [employer] Advantage PCP     Review of Systems    Objective   Vitals:  /70   Pulse 68   Ht 1.702 m (5' 7\")   Wt 81.6 kg (180 lb)   BMI 28.19 kg/m²       Physical Exam    Assessment & Plan  Mixed hyperlipidemia    Orders:    Follow Up In [facility] - PCP - Established    Paroxysmal atrial fibrillation (Multi)    Orders:    Follow Up In [facility] - PCP - Established    Benign essential hypertension    Orders:    Follow Up In [facility] - PCP - Established              "

## 2025-01-02 NOTE — ASSESSMENT & PLAN NOTE
Up-to-date with vaccinations and screenings updated Prevnar 20 vaccination at this time discussed advanced medical directives

## 2025-01-02 NOTE — ASSESSMENT & PLAN NOTE
In light of established cardiovascular disease by cardiac catheterization LDL goal less than 70 continue atorvastatin 40 mg daily.  Patient difficulty in tolerating rosuvastatin in the past if LDL goal not achieved consider addition of ezetimibe and/or bempedoic acid will follow-up with cardiology to discuss this further

## 2025-01-02 NOTE — ASSESSMENT & PLAN NOTE
Recent workup as a result of strong family history of cardiovascular disease in both brother and sister revealed high calcium score however cardiac catheterization completed revealed minimal disease less than 30% LAD continue strong secondary prevention with optimization of blood pressure and dyslipidemia as well as baby aspirin patient continues to have mild symptoms in the form of chronic stable angina is to be evaluated for ablation therapy for persistent atrial fibrillation continues on 2 antianginal medications with metoprolol and amlodipine could consider addition of isosorbide patient feels mild at this time does not wish to use medication in the setting of erectile dysfunction and use of sildenafil continue to follow closely stable  Orders:    Follow Up In [facility] - PCP - Established; Future

## 2025-01-03 NOTE — ASSESSMENT & PLAN NOTE
As noted above rate controlled propensity for mild bradycardia no symptoms of tacky bradycardia symptoms at this time has follow-up with EP specialist to evaluate for possible ablative therapy in the future mildly symptomatic and stable at this time continue apixaban 5 mg twice a day

## 2025-01-18 ASSESSMENT — ENCOUNTER SYMPTOMS
BLURRED VISION: 0
DEPRESSION: 0
SHORTNESS OF BREATH: 0
DYSPNEA ON EXERTION: 0
FOCAL WEAKNESS: 0
SYNCOPE: 0
PALPITATIONS: 0
MEMORY LOSS: 0
COUGH: 0
RESPIRATORY NEGATIVE: 1
DECREASED APPETITE: 0
GASTROINTESTINAL NEGATIVE: 1
CONSTITUTIONAL NEGATIVE: 1
IRREGULAR HEARTBEAT: 0
ORTHOPNEA: 0
FALLS: 0

## 2025-01-18 NOTE — PROGRESS NOTES
Chief Complaint/Reason for Visit:   Patient is coming in today for a 2 month cardiovascular follow up. S/p [facility]    History Of Present Illness:    Mr. Templeton is coming in today as a 2-month cardiovascular follow-up.  We have followed this patient previously for dyslipidemia, hypertension, atrial fibrillation, atypical chest pain, and an abnormal coronary calcium score.  Stress testing done in November showed no EKG or echocardiographic evidence for ischemia.  He did have anginal symptoms and was scheduled for a heart catheterization.  Left heart catheterization done December 12, 2024 showed mild, nonobstructive coronary artery disease, long segment of the mid LAD with moderate systolic compression.  Recommendations were to optimize beta-blocker dosing and continue with medical therapy.    Patient comes in accompanied by his wife.  He has not had any chest pain, pressure, palpitations, orthopnea, or edema.  He is taking his medication as prescribed.  Metoprolol succinate was increased to 100 mg daily on hospital discharge.  Patient has noticed some mild positional lightheadedness but nothing significant.    Past Medical History:  He has a past medical history of Abnormal stress echocardiogram (11/25/2024), Encounter for preprocedural cardiovascular examination (03/15/2017), Obstructive sleep apnea (adult) (pediatric), Personal history of other diseases of the circulatory system (03/07/2019), Personal history of other diseases of the digestive system, and Personal history of other diseases of the musculoskeletal system and connective tissue.    Past Surgical History:  He has a past surgical history that includes Total knee arthroplasty (03/10/2017); Other surgical history (03/10/2017); Other surgical history (03/10/2017); Rotator cuff repair (03/15/2017); and Cardiac catheterization (N/A, 12/12/2024).      Social History:  He reports that he has never smoked. He has never used smokeless tobacco. He reports that he  "does not currently use alcohol. He reports that he does not use drugs.    Family History:  Family History   Problem Relation Name Age of Onset    Atrial fibrillation Mother      Other (CVA) Mother      Coronary artery disease Father      Heart attack Father          Allergies:  Crestor [rosuvastatin]    Medications:  Current Outpatient Medications   Medication Instructions    amLODIPine (NORVASC) 5 mg, oral, Daily    apixaban (ELIQUIS) 5 mg, oral, 2 times daily    atorvastatin (LIPITOR) 40 mg, oral, Daily    losartan-hydrochlorothiazide (Hyzaar) 100-25 mg tablet 1 tablet, oral, Daily    metoprolol succinate XL (TOPROL-XL) 100 mg, oral, Daily    omeprazole (PRILOSEC) 20 mg, oral, Daily    sildenafil (VIAGRA) 100 mg, oral, As needed       Review of Systems:  Review of Systems   Constitutional: Negative. Negative for decreased appetite and malaise/fatigue.   HENT: Negative.     Eyes:  Negative for blurred vision and visual disturbance.   Cardiovascular:  Negative for chest pain, dyspnea on exertion, irregular heartbeat, leg swelling, orthopnea, palpitations and syncope.   Respiratory: Negative.  Negative for cough and shortness of breath.    Musculoskeletal:  Negative for arthritis and falls.   Gastrointestinal: Negative.    Neurological:  Positive for light-headedness (positional). Negative for focal weakness.   Psychiatric/Behavioral:  Negative for depression and memory loss.         Vitals  Visit Vitals  /80 (BP Location: Left arm, Patient Position: Sitting, BP Cuff Size: Adult)   Pulse 57   Ht 1.702 m (5' 7\")   Wt 85.7 kg (189 lb)   SpO2 96%   BMI 29.60 kg/m²   Smoking Status Never   BSA 2.01 m²        Physical Exam:  Physical Exam  Constitutional:       Appearance: Normal appearance.   HENT:      Head: Normocephalic.   Eyes:      Conjunctiva/sclera: Conjunctivae normal.   Cardiovascular:      Rate and Rhythm: Normal rate. Rhythm irregular.      Pulses: Normal pulses.      Heart sounds: S1 normal and S2 " "normal. No murmur heard.     No friction rub. No gallop.   Pulmonary:      Effort: Pulmonary effort is normal.      Breath sounds: Normal breath sounds.   Abdominal:      General: Bowel sounds are normal.      Palpations: Abdomen is soft.      Tenderness: There is no abdominal tenderness.   Musculoskeletal:      Cervical back: Neck supple.      Right lower leg: No edema.      Left lower leg: No edema.      Comments: Radial site healed   Skin:     General: Skin is warm and dry.   Neurological:      General: No focal deficit present.      Mental Status: He is alert and oriented to person, place, and time.   Psychiatric:         Attention and Perception: Attention normal.         Mood and Affect: Mood normal.           Last Labs:  CBC -  Lab Results   Component Value Date    WBC 7.1 12/02/2024    HGB 15.7 12/02/2024    HCT 45.8 12/02/2024    MCV 86 12/02/2024     12/02/2024     Lab Results   Component Value Date    GLUCOSE 83 12/02/2024    CALCIUM 8.8 12/02/2024     12/02/2024    K 3.6 12/02/2024    CO2 29 12/02/2024     12/02/2024    BUN 13 12/02/2024    CREATININE 0.97 12/02/2024      CMP -  Lab Results   Component Value Date    CALCIUM 8.8 12/02/2024    PROT 7.1 06/27/2024    ALBUMIN 4.4 06/27/2024    AST 13 06/27/2024    ALT 16 06/27/2024    ALKPHOS 53 06/27/2024    BILITOT 1.2 06/27/2024       LIPID PANEL -   Lab Results   Component Value Date    CHOL 141 06/10/2024    TRIG 94 06/10/2024    HDL 35.2 06/10/2024    CHHDL 4.0 06/10/2024    LDLF 89 06/12/2023    VLDL 19 06/10/2024    NHDL 106 06/10/2024       No results found for: \"BNP\", \"HGBA1C\"    Last Cardiology Tests:    Stress Echo:11-4-24  Summary:   1. Anginal symptoms with exercise, resolved at rest/recovery.   2. Adequate level of stress achieved.   3. No echocardiographic or electrocardiographic evidence for ischemia at maximal workload.     Cath: 12-12-24  CONCLUSIONS:   1. Mild nonobstructive CAD as described.   2. Long segment of mid " LAD moderate systolic compression.   3. Mild LV-AO peak to peak pullback gradient.   4. Left Ventricular end-diastolic pressure = 7.    Lab review: I have personally reviewed the laboratory result(s)     Assessment/Plan:  ASHD: Recent heart catheterization showed mild nonobstructive coronary artery disease.  He also had myocardial bridging with moderate systolic compression of the mid LAD.  Patient's beta-blocker dose was increased.  Heart rate and blood pressure have been well-controlled.  He has not had any further chest discomfort.    Atrial fibrillation: Patient is likely chronic/permanent atrial fibrillation.  He is a regular on exam.  He will continue on metoprolol succinate 100 mg daily.  Patient has an elevated [id number] score.  He is not having any abnormal bleeding or bruising and should continue on Eliquis 5 mg twice daily.    Hypertension: Blood pressure today is reasonable.  His home blood pressures are generally lower.  Patient will continue on amlodipine 5 mg daily, losartan/hydrochlorothiazide 100/25 mg daily, and metoprolol succinate 100 mg daily.  He should be on a heart healthy low-sodium diet.    Dyslipidemia: Lipid labs from June, 2024 showed an LDL of 87, triglycerides 94.  Patient was previously on pravastatin and after his June lab work was changed to atorvastatin 40 mg nightly.  Patient is due for a fasting lipid profile this winter.  Ideally we would like to see his LDL less than 70    Patient will follow-up with me in 4 months.  He follows up with Dr. Narayanan in October.  Patient instructed to call with any cardiovascular complaints. All questions were answered.       Dragon dictation was utilized to create this document. Quite often unanticipated grammatical, syntax,  and other interpretive errors are inadvertently transcribed by the computer software.  Please disregard these errors.  Please excuse any errors that have escaped final proofreading.          Patti Gimenez,  APRN-CNP

## 2025-01-21 ENCOUNTER — OFFICE VISIT (OUTPATIENT)
Dept: CARDIOLOGY | Facility: HOSPITAL | Age: 75
End: 2025-01-21
Payer: MEDICARE

## 2025-01-21 VITALS
HEIGHT: 67 IN | BODY MASS INDEX: 29.66 KG/M2 | DIASTOLIC BLOOD PRESSURE: 80 MMHG | HEART RATE: 57 BPM | WEIGHT: 189 LBS | SYSTOLIC BLOOD PRESSURE: 132 MMHG | OXYGEN SATURATION: 96 %

## 2025-01-21 DIAGNOSIS — E78.5 DYSLIPIDEMIA, GOAL LDL BELOW 70: ICD-10-CM

## 2025-01-21 DIAGNOSIS — Q24.5 CORONARY-MYOCARDIAL BRIDGE (HHS-HCC): ICD-10-CM

## 2025-01-21 DIAGNOSIS — R93.1 ELEVATED CORONARY ARTERY CALCIUM SCORE: ICD-10-CM

## 2025-01-21 DIAGNOSIS — I25.10 ASHD (ARTERIOSCLEROTIC HEART DISEASE): Primary | ICD-10-CM

## 2025-01-21 DIAGNOSIS — R07.89 ATYPICAL CHEST PAIN: ICD-10-CM

## 2025-01-21 DIAGNOSIS — I10 BENIGN ESSENTIAL HYPERTENSION: ICD-10-CM

## 2025-01-21 PROCEDURE — 3008F BODY MASS INDEX DOCD: CPT | Performed by: NURSE PRACTITIONER

## 2025-01-21 PROCEDURE — 1157F ADVNC CARE PLAN IN RCRD: CPT | Performed by: NURSE PRACTITIONER

## 2025-01-21 PROCEDURE — 3079F DIAST BP 80-89 MM HG: CPT | Performed by: NURSE PRACTITIONER

## 2025-01-21 PROCEDURE — 1036F TOBACCO NON-USER: CPT | Performed by: NURSE PRACTITIONER

## 2025-01-21 PROCEDURE — 99213 OFFICE O/P EST LOW 20 MIN: CPT | Performed by: NURSE PRACTITIONER

## 2025-01-21 PROCEDURE — 1159F MED LIST DOCD IN RCRD: CPT | Performed by: NURSE PRACTITIONER

## 2025-01-21 PROCEDURE — 3075F SYST BP GE 130 - 139MM HG: CPT | Performed by: NURSE PRACTITIONER

## 2025-01-21 PROCEDURE — 1160F RVW MEDS BY RX/DR IN RCRD: CPT | Performed by: NURSE PRACTITIONER

## 2025-01-21 RX ORDER — METOPROLOL SUCCINATE 100 MG/1
100 TABLET, EXTENDED RELEASE ORAL DAILY
Qty: 90 TABLET | Refills: 3 | Status: SHIPPED | OUTPATIENT
Start: 2025-01-21

## 2025-01-21 ASSESSMENT — ENCOUNTER SYMPTOMS: LIGHT-HEADEDNESS: 1

## 2025-01-21 NOTE — PATIENT INSTRUCTIONS
Continue on current meds  Heart healthy, low sodium diet  Mediterranean diet is recommended  See me in 4 months  Follow up with Dr Narayanan in OCT

## 2025-01-29 LAB
CHOLEST SERPL-MCNC: 119 MG/DL
CHOLEST/HDLC SERPL: 3.3 (CALC)
HDLC SERPL-MCNC: 36 MG/DL
LDLC SERPL CALC-MCNC: 69 MG/DL (CALC)
NONHDLC SERPL-MCNC: 83 MG/DL (CALC)
TRIGL SERPL-MCNC: 60 MG/DL

## 2025-02-07 ENCOUNTER — TELEPHONE (OUTPATIENT)
Dept: CARDIOLOGY | Facility: HOSPITAL | Age: 75
End: 2025-02-07
Payer: MEDICARE

## 2025-02-07 NOTE — TELEPHONE ENCOUNTER
Called patient with non critical lipid panel results. No change in care plan at this time, patient can follow up as scheduled. Patient did not answer, staff left a message telling him to call with any questions.

## 2025-05-06 ENCOUNTER — APPOINTMENT (OUTPATIENT)
Dept: CARDIOLOGY | Facility: HOSPITAL | Age: 75
End: 2025-05-06
Payer: MEDICARE

## 2025-05-25 ASSESSMENT — ENCOUNTER SYMPTOMS
CONSTITUTIONAL NEGATIVE: 1
LIGHT-HEADEDNESS: 1
SYNCOPE: 0
MEMORY LOSS: 0
ORTHOPNEA: 0
PALPITATIONS: 0
IRREGULAR HEARTBEAT: 0
DEPRESSION: 0
SHORTNESS OF BREATH: 0
DECREASED APPETITE: 0
FALLS: 0
RESPIRATORY NEGATIVE: 1
DYSPNEA ON EXERTION: 0
FOCAL WEAKNESS: 0
BLURRED VISION: 0
GASTROINTESTINAL NEGATIVE: 1
COUGH: 0

## 2025-05-25 NOTE — PROGRESS NOTES
Chief Complaint/Reason for Visit:   Patient is coming in today for a 4 month cardiovascular follow up.     History Of Present Illness:    Mr. Templeton is coming today as a 4-month cardiovascular follow-up.  We have followed this patient previously for dyslipidemia, atrial fibrillation, hypertension, abnormal coronary calcium score, and atypical chest pain.  There was a stress test done in November, 2024 that was negative for ischemia.  Patient had ongoing anginal chest pain and December 12, 2024 had a left heart catheterization which showed mild, nonobstructive coronary artery disease, mid LAD with moderate systolic compression.  Patient's beta-blockers were optimized at that time.    Patient comes in today accompanied by his wife.  He has had no recent hospitalizations or ED visits.  He denies any chest pain, pressure, palpitations, orthopnea, or edema.  He has some mild positional lightheadedness but no syncopal or near syncopal episodes.  Patient is taking his medication as prescribed.  Home blood pressures have been well-controlled.    Past Medical History:  He has a past medical history of Abnormal stress echocardiogram (11/25/2024), Atrial fibrillation (Multi), Encounter for preprocedural cardiovascular examination (03/15/2017), Hyperlipidemia, Hypertension, Obstructive sleep apnea (adult) (pediatric), Personal history of other diseases of the circulatory system (03/07/2019), Personal history of other diseases of the digestive system, and Personal history of other diseases of the musculoskeletal system and connective tissue.    Past Surgical History:  He has a past surgical history that includes Total knee arthroplasty (03/10/2017); Other surgical history (03/10/2017); Other surgical history (03/10/2017); Rotator cuff repair (03/15/2017); and Cardiac catheterization (N/A, 12/12/2024).      Social History:  He reports that he has never smoked. He has never used smokeless tobacco. He reports that he does not  "currently use alcohol. He reports that he does not use drugs.    Family History:  Family History   Problem Relation Name Age of Onset    Atrial fibrillation Mother Alethea Gutierrez     Other (CVA) Mother Alethea Gutierrez     Coronary artery disease Father Gerhard Templeton     Heart attack Father Gerhard Templeton         Allergies:  Crestor [rosuvastatin]    Medications:  Current Outpatient Medications   Medication Instructions    amLODIPine (NORVASC) 5 mg, oral, Daily    apixaban (ELIQUIS) 5 mg, oral, 2 times daily    atorvastatin (LIPITOR) 40 mg, oral, Daily    losartan-hydrochlorothiazide (Hyzaar) 100-25 mg tablet 1 tablet, oral, Daily    metoprolol succinate XL (TOPROL-XL) 100 mg, oral, Daily    omeprazole (PRILOSEC) 20 mg, oral, Daily    sildenafil (VIAGRA) 100 mg, oral, As needed       Review of Systems:  Review of Systems   Constitutional: Negative. Negative for decreased appetite and malaise/fatigue.   HENT: Negative.     Eyes:  Negative for blurred vision and visual disturbance.   Cardiovascular:  Negative for chest pain, dyspnea on exertion, irregular heartbeat, leg swelling, orthopnea, palpitations and syncope.   Respiratory: Negative.  Negative for cough and shortness of breath.    Musculoskeletal:  Negative for arthritis and falls.   Gastrointestinal: Negative.    Neurological:  Positive for light-headedness (positional). Negative for focal weakness.   Psychiatric/Behavioral:  Negative for depression and memory loss.         Vitals  Visit Vitals  /78   Pulse 74   Ht 1.702 m (5' 7\")   Wt 85.7 kg (189 lb)   SpO2 98%   BMI 29.60 kg/m²   Smoking Status Never   BSA 2.01 m²        Physical Exam:  Physical Exam  Constitutional:       Appearance: Normal appearance.   HENT:      Head: Normocephalic.   Eyes:      Conjunctiva/sclera: Conjunctivae normal.   Cardiovascular:      Rate and Rhythm: Normal rate. Rhythm irregular.      Pulses: Normal pulses.      Heart sounds: S1 normal and S2 " "normal. No murmur heard.     No friction rub. No gallop.   Pulmonary:      Effort: Pulmonary effort is normal.      Breath sounds: Normal breath sounds.   Abdominal:      General: Bowel sounds are normal.      Palpations: Abdomen is soft.      Tenderness: There is no abdominal tenderness.   Musculoskeletal:      Cervical back: Neck supple.      Right lower leg: No edema.      Left lower leg: No edema.   Skin:     General: Skin is warm and dry.   Neurological:      General: No focal deficit present.      Mental Status: He is alert and oriented to person, place, and time.   Psychiatric:         Attention and Perception: Attention normal.         Mood and Affect: Mood normal.         Last Labs:  CBC -  Lab Results   Component Value Date    WBC 7.1 12/02/2024    HGB 15.7 12/02/2024    HCT 45.8 12/02/2024    MCV 86 12/02/2024     12/02/2024     Lab Results   Component Value Date    GLUCOSE 83 12/02/2024    CALCIUM 8.8 12/02/2024     12/02/2024    K 3.6 12/02/2024    CO2 29 12/02/2024     12/02/2024    BUN 13 12/02/2024    CREATININE 0.97 12/02/2024      CMP -  Lab Results   Component Value Date    CALCIUM 8.8 12/02/2024    PROT 7.1 06/27/2024    ALBUMIN 4.4 06/27/2024    AST 13 06/27/2024    ALT 16 06/27/2024    ALKPHOS 53 06/27/2024    BILITOT 1.2 06/27/2024       LIPID PANEL -   Lab Results   Component Value Date    CHOL 119 01/28/2025    TRIG 60 01/28/2025    HDL 36 (L) 01/28/2025    CHHDL 3.3 01/28/2025    LDLF 89 06/12/2023    VLDL 19 06/10/2024    NHDL 83 01/28/2025       No results found for: \"BNP\", \"HGBA1C\"    Last Cardiology Tests:    Stress Echo:11-4-24  Summary:   1. Anginal symptoms with exercise, resolved at rest/recovery.   2. Adequate level of stress achieved.   3. No echocardiographic or electrocardiographic evidence for ischemia at maximal workload.     Cath: 12-12-24  CONCLUSIONS:   1. Mild nonobstructive CAD as described.   2. Long segment of mid LAD moderate systolic compression.   " 3. Mild LV-AO peak to peak pullback gradient.   4. Left Ventricular end-diastolic pressure = 7.    Lab review: I have personally reviewed the laboratory result(s)     Assessment/Plan:  ASHD: Heart catheterization from December, 2024 showed mild, nonobstructive coronary artery disease along with myocardial bridging with moderate systolic compression of the mid LAD.  Patient is not having any anginal symptoms.  Heart rate and blood pressure are well-controlled.  He should continue on metoprolol, losartan/hydrochlorothiazide, amlodipine, and atorvastatin.  He is not on antiplatelet medication due to the need for anticoagulation.    Hypertension: Blood pressure today shows excellent control at 122/78.  Patient will continue on amlodipine, losartan/hydrochlorothiazide, and metoprolol.    Atrial fibrillation (permanent): Patient has rate controlled atrial fibrillation.  Heart rate today is in the 70s.  Patient has an elevated [id number] score requiring anticoagulation.  He should continue on metoprolol succinate 100 mg daily and Eliquis 5 mg twice daily.  He is not having any abnormal bleeding or bruising.    Dyslipidemia: Lipid labs from January 28, 2025 shows an LDL of 69, triglycerides 60.  Patient will continue on atorvastatin 40 mg nightly.  He also should be on a heart healthy low-sodium diet.    Patient will follow-up with Dr. Narayanan in October.  Patient instructed to call with any cardiovascular complaints. All questions were answered.       Dragon dictation was utilized to create this document. Quite often unanticipated grammatical, syntax,  and other interpretive errors are inadvertently transcribed by the computer software.  Please disregard these errors.  Please excuse any errors that have escaped final proofreading.          Patti Gimenez, APRN-CNP

## 2025-06-02 ENCOUNTER — OFFICE VISIT (OUTPATIENT)
Dept: CARDIOLOGY | Facility: HOSPITAL | Age: 75
End: 2025-06-02
Payer: MEDICARE

## 2025-06-02 VITALS
OXYGEN SATURATION: 98 % | SYSTOLIC BLOOD PRESSURE: 122 MMHG | BODY MASS INDEX: 29.66 KG/M2 | HEART RATE: 74 BPM | HEIGHT: 67 IN | DIASTOLIC BLOOD PRESSURE: 78 MMHG | WEIGHT: 189 LBS

## 2025-06-02 DIAGNOSIS — I25.10 ASHD (ARTERIOSCLEROTIC HEART DISEASE): Primary | ICD-10-CM

## 2025-06-02 DIAGNOSIS — E78.5 DYSLIPIDEMIA: ICD-10-CM

## 2025-06-02 DIAGNOSIS — I48.11 LONGSTANDING PERSISTENT ATRIAL FIBRILLATION (MULTI): ICD-10-CM

## 2025-06-02 DIAGNOSIS — I10 ESSENTIAL (PRIMARY) HYPERTENSION: ICD-10-CM

## 2025-06-02 PROCEDURE — 99214 OFFICE O/P EST MOD 30 MIN: CPT | Performed by: NURSE PRACTITIONER

## 2025-06-02 PROCEDURE — 3074F SYST BP LT 130 MM HG: CPT | Performed by: NURSE PRACTITIONER

## 2025-06-02 PROCEDURE — 3078F DIAST BP <80 MM HG: CPT | Performed by: NURSE PRACTITIONER

## 2025-06-02 PROCEDURE — 1160F RVW MEDS BY RX/DR IN RCRD: CPT | Performed by: NURSE PRACTITIONER

## 2025-06-02 PROCEDURE — 1159F MED LIST DOCD IN RCRD: CPT | Performed by: NURSE PRACTITIONER

## 2025-06-02 PROCEDURE — 1036F TOBACCO NON-USER: CPT | Performed by: NURSE PRACTITIONER

## 2025-06-02 NOTE — PATIENT INSTRUCTIONS
Continue on current meds  Heart healthy, low sodium diet  Mediterranean diet is recommended  Follow up with Dr Narayanan in OCT

## 2025-07-03 ENCOUNTER — APPOINTMENT (OUTPATIENT)
Dept: PRIMARY CARE | Facility: CLINIC | Age: 75
End: 2025-07-03
Payer: MEDICARE

## 2025-07-03 VITALS
SYSTOLIC BLOOD PRESSURE: 124 MMHG | WEIGHT: 187 LBS | OXYGEN SATURATION: 97 % | HEART RATE: 63 BPM | HEIGHT: 67 IN | DIASTOLIC BLOOD PRESSURE: 78 MMHG | BODY MASS INDEX: 29.35 KG/M2

## 2025-07-03 DIAGNOSIS — N52.9 MALE ERECTILE DISORDER OF ORGANIC ORIGIN: ICD-10-CM

## 2025-07-03 DIAGNOSIS — I20.89 STABLE ANGINA PECTORIS: ICD-10-CM

## 2025-07-03 DIAGNOSIS — I10 BENIGN ESSENTIAL HYPERTENSION: ICD-10-CM

## 2025-07-03 DIAGNOSIS — N40.1 BENIGN PROSTATIC HYPERPLASIA WITH URINARY HESITANCY: ICD-10-CM

## 2025-07-03 DIAGNOSIS — E78.5 DYSLIPIDEMIA, GOAL LDL BELOW 70: ICD-10-CM

## 2025-07-03 DIAGNOSIS — K21.9 GASTROESOPHAGEAL REFLUX DISEASE WITHOUT ESOPHAGITIS: ICD-10-CM

## 2025-07-03 DIAGNOSIS — I48.19 PERSISTENT ATRIAL FIBRILLATION (MULTI): Primary | ICD-10-CM

## 2025-07-03 DIAGNOSIS — G47.33 OSA ON CPAP: ICD-10-CM

## 2025-07-03 DIAGNOSIS — I48.0 PAROXYSMAL ATRIAL FIBRILLATION (MULTI): ICD-10-CM

## 2025-07-03 DIAGNOSIS — R39.11 BENIGN PROSTATIC HYPERPLASIA WITH URINARY HESITANCY: ICD-10-CM

## 2025-07-03 PROCEDURE — 1159F MED LIST DOCD IN RCRD: CPT | Performed by: INTERNAL MEDICINE

## 2025-07-03 PROCEDURE — G2211 COMPLEX E/M VISIT ADD ON: HCPCS | Performed by: INTERNAL MEDICINE

## 2025-07-03 PROCEDURE — 3074F SYST BP LT 130 MM HG: CPT | Performed by: INTERNAL MEDICINE

## 2025-07-03 PROCEDURE — 1160F RVW MEDS BY RX/DR IN RCRD: CPT | Performed by: INTERNAL MEDICINE

## 2025-07-03 PROCEDURE — 3078F DIAST BP <80 MM HG: CPT | Performed by: INTERNAL MEDICINE

## 2025-07-03 PROCEDURE — 99213 OFFICE O/P EST LOW 20 MIN: CPT | Performed by: INTERNAL MEDICINE

## 2025-07-03 PROCEDURE — 90471 IMMUNIZATION ADMIN: CPT | Performed by: INTERNAL MEDICINE

## 2025-07-03 PROCEDURE — 1036F TOBACCO NON-USER: CPT | Performed by: INTERNAL MEDICINE

## 2025-07-03 PROCEDURE — 90715 TDAP VACCINE 7 YRS/> IM: CPT | Performed by: INTERNAL MEDICINE

## 2025-07-03 RX ORDER — AMLODIPINE BESYLATE 5 MG/1
5 TABLET ORAL DAILY
Qty: 90 TABLET | Refills: 3 | Status: SHIPPED | OUTPATIENT
Start: 2025-07-03

## 2025-07-03 RX ORDER — OMEPRAZOLE 20 MG/1
20 CAPSULE, DELAYED RELEASE ORAL DAILY
Qty: 90 CAPSULE | Refills: 3 | Status: SHIPPED | OUTPATIENT
Start: 2025-07-03

## 2025-07-03 RX ORDER — LOSARTAN POTASSIUM AND HYDROCHLOROTHIAZIDE 25; 100 MG/1; MG/1
1 TABLET ORAL DAILY
Qty: 90 TABLET | Refills: 3 | Status: SHIPPED | OUTPATIENT
Start: 2025-07-03

## 2025-07-03 ASSESSMENT — ENCOUNTER SYMPTOMS
OCCASIONAL FEELINGS OF UNSTEADINESS: 0
LOSS OF SENSATION IN FEET: 0
DEPRESSION: 0

## 2025-07-03 NOTE — ASSESSMENT & PLAN NOTE
Cardiac catheterization revealed minimal LAD disease after calcium CT scoring completed patient doing well setting of persistent A-fib continue metoprolol succinate  mg daily  Orders:    Follow Up In [facility] - PCP - Established

## 2025-07-03 NOTE — ASSESSMENT & PLAN NOTE
Longstanding persistent A-fib at this time asymptomatic rate controlled on metoprolol succinate  mg daily continue apixaban 5 mg twice a day  Orders:    CBC and Auto Differential; Future    Comprehensive Metabolic Panel; Future    Lipid Panel; Future    Albumin-Creatinine Ratio, Urine Random; Future

## 2025-07-03 NOTE — PROGRESS NOTES
"Subjective   Reason for Visit: Gerhard Templeton is an 75 y.o. male here for a Medicare Wellness visit.     Past Medical, Surgical, and Family History reviewed and updated in chart.    Reviewed all medications by prescribing practitioner or clinical pharmacist (such as prescriptions, OTCs, herbal therapies and supplements) and documented in the medical record.    HPI    Patient Care Team:  Kavon Shepherd DO as PCP - General  Kavon Shepherd DO as PCP - [employer] PCP     Review of Systems   All other systems reviewed and are negative.      Objective   Vitals:  /78   Pulse 63   Ht 1.702 m (5' 7\")   Wt 84.8 kg (187 lb)   SpO2 97%   BMI 29.29 kg/m²       Physical Exam  Vitals and nursing note reviewed.   Constitutional:       General: He is not in acute distress.     Appearance: Normal appearance. He is well-developed. He is not toxic-appearing.   HENT:      Head: Normocephalic and atraumatic.      Right Ear: Tympanic membrane and external ear normal.      Left Ear: Tympanic membrane and external ear normal.      Nose: Nose normal.      Mouth/Throat:      Mouth: Mucous membranes are moist.      Pharynx: Oropharynx is clear. No oropharyngeal exudate or posterior oropharyngeal erythema.      Tonsils: No tonsillar exudate. 2+ on the right. 2+ on the left.   Eyes:      Extraocular Movements: Extraocular movements intact.      Conjunctiva/sclera: Conjunctivae normal.   Cardiovascular:      Rate and Rhythm: Normal rate. Rhythm irregular.      Pulses: Normal pulses.      Heart sounds: Normal heart sounds. No murmur heard.  Pulmonary:      Effort: Pulmonary effort is normal.      Breath sounds: Normal breath sounds.   Abdominal:      General: Abdomen is flat. Bowel sounds are normal.      Palpations: Abdomen is soft.   Musculoskeletal:      Cervical back: Neck supple.   Feet:      Right foot:      Skin integrity: Skin integrity normal. No ulcer, blister, skin breakdown, erythema, warmth or " callus.      Toenail Condition: Right toenails are normal.      Left foot:      Skin integrity: Skin integrity normal. No ulcer, blister, skin breakdown, erythema, warmth or callus.      Toenail Condition: Left toenails are normal.   Lymphadenopathy:      Cervical: No cervical adenopathy.   Skin:     General: Skin is warm and dry.      Capillary Refill: Capillary refill takes more than 3 seconds.      Findings: No rash.   Neurological:      Mental Status: He is alert. Mental status is at baseline.      Sensory: Sensation is intact.   Psychiatric:         Mood and Affect: Mood normal.         Behavior: Behavior normal.         Thought Content: Thought content normal.         Judgment: Judgment normal.     Lifestyle Recommendations  I recommend a whole-food plant-based diet, an eating pattern that encourages the consumption of unrefined plant foods (such as fruits, vegetables, tubers, whole grains, legumes, nuts and seeds) and discourages meats, dairy products, eggs and processed foods.     The AHA/ACC recommends that the patient consume a dietary pattern that emphasizes intake of vegetables, fruits, and whole grains; includes low-fat dairy products, poultry, fish, legumes, non-tropical vegetable oils, and nuts; and limits intake of sodium, sweets, sugar-sweetened beverages, and red meats.  Adapt this dietary pattern to appropriate calorie requirements (a 500-750 kcal/day deficit to loose weight), personal and cultural food preferences, and nutrition therapy for other medical conditions (including diabetes).  Achieve this pattern by following plans such as the [place] Mediterranean, DASH dietary pattern, or AHA diet.     Engage in 2 hours and 30 minutes per week of moderate-intensity physical activity, or 1 hour and 15 minutes (75 minutes) per week of vigorous-intensity aerobic physical activity, or an equivalent combination of moderate and vigorous-intensity aerobic physical activity. Aerobic activity should be  performed in episodes of at least 10 minutes preferably spread throughout the week.     Adhering to a heart healthy diet, regular exercise habits, avoidance of tobacco products, and maintenance of a healthy weight are crucial components of their heart disease risk reduction.  This may not meet the criteria for a clinical depression diagnosis. Symptoms were reviewed with Gerhard.  Follow-up within the next 3 months is recommended to re-assess symptoms and monitor mental health status.     Assessment & Plan  Stable angina pectoris  Cardiac catheterization revealed minimal LAD disease after calcium CT scoring completed patient doing well setting of persistent A-fib continue metoprolol succinate  mg daily  Orders:    Follow Up In [facility] - PCP - Established    Benign essential hypertension  Optimal blood pressure control continue amlodipine 5 mg daily losartan hydrochlorothiazide 100/25 1 tablet daily with metoprolol succinate  mg daily  Orders:    amLODIPine (Norvasc) 5 mg tablet; Take 1 tablet (5 mg) by mouth once daily.    losartan-hydrochlorothiazide (Hyzaar) 100-25 mg tablet; Take 1 tablet by mouth once daily.    omeprazole (PriLOSEC) 20 mg DR capsule; Take 1 capsule (20 mg) by mouth once daily.    CBC and Auto Differential; Future    Comprehensive Metabolic Panel; Future    Lipid Panel; Future    Albumin-Creatinine Ratio, Urine Random; Future    Persistent atrial fibrillation (Multi)  Longstanding persistent A-fib at this time asymptomatic rate controlled on metoprolol succinate  mg daily continue apixaban 5 mg twice a day  Orders:    CBC and Auto Differential; Future    Comprehensive Metabolic Panel; Future    Lipid Panel; Future    Albumin-Creatinine Ratio, Urine Random; Future    Benign prostatic hyperplasia with urinary hesitancy  Symptoms stable at this time follow-up with urologist reevaluate PSA next visit  Orders:    PSA; Future    Dyslipidemia, goal LDL below 70  Optimal  LDL cholesterol continue atorvastatin 40 mg daily       Gastroesophageal reflux disease without esophagitis  Good symptom control with omeprazole 20 mg daily       ROSA on CPAP  Compliant with her use of CPAP on a nightly basis       Male erectile disorder of organic origin  Continue with sildenafil as directed in the setting of enlarged prostate

## 2025-07-03 NOTE — ASSESSMENT & PLAN NOTE
Symptoms stable at this time follow-up with urologist reevaluate PSA next visit  Orders:    PSA; Future

## 2025-07-03 NOTE — ASSESSMENT & PLAN NOTE
Optimal blood pressure control continue amlodipine 5 mg daily losartan hydrochlorothiazide 100/25 1 tablet daily with metoprolol succinate  mg daily  Orders:    amLODIPine (Norvasc) 5 mg tablet; Take 1 tablet (5 mg) by mouth once daily.    losartan-hydrochlorothiazide (Hyzaar) 100-25 mg tablet; Take 1 tablet by mouth once daily.    omeprazole (PriLOSEC) 20 mg DR capsule; Take 1 capsule (20 mg) by mouth once daily.    CBC and Auto Differential; Future    Comprehensive Metabolic Panel; Future    Lipid Panel; Future    Albumin-Creatinine Ratio, Urine Random; Future

## 2025-07-03 NOTE — PROGRESS NOTES
"Subjective   Patient ID: Gerhard Templeton is a 75 y.o. male who presents for Follow-up.    HPI     Review of Systems    Objective   /78   Pulse 63   Ht 1.702 m (5' 7\")   Wt 84.8 kg (187 lb)   SpO2 97%   BMI 29.29 kg/m²     Physical Exam    Assessment/Plan          "

## 2026-01-15 ENCOUNTER — APPOINTMENT (OUTPATIENT)
Dept: PRIMARY CARE | Facility: CLINIC | Age: 76
End: 2026-01-15
Payer: MEDICARE

## (undated) DEVICE — CATHETER, DIAGNOSTIC, DXTERITY, 5FR PIG145, 110CM.6 SH

## (undated) DEVICE — TR BAND, RADIAL COMPRESSION, STANDARD, 24CM

## (undated) DEVICE — SHEATH, GLIDESHEATH, SLENDER, 6FR 10CM

## (undated) DEVICE — CATHETER, DIAGNOSTIC, DXTERITY, 6FR 100CM, JL35

## (undated) DEVICE — GUIDEWIRE, INQWIRE, 3MM J, .035 X 210CM, FIXED

## (undated) DEVICE — CATHETER, OPTITORQUE, 6FR, JACKY, BL3.5/2H/100CM